# Patient Record
Sex: FEMALE | Race: WHITE | Employment: FULL TIME | URBAN - METROPOLITAN AREA
[De-identification: names, ages, dates, MRNs, and addresses within clinical notes are randomized per-mention and may not be internally consistent; named-entity substitution may affect disease eponyms.]

---

## 2021-05-24 DIAGNOSIS — Z11.59 ENCOUNTER FOR SCREENING FOR OTHER VIRAL DISEASES: ICD-10-CM

## 2021-06-05 ENCOUNTER — HOSPITAL ENCOUNTER (OUTPATIENT)
Dept: LAB | Facility: CLINIC | Age: 56
Discharge: HOME | End: 2021-06-05
Attending: ORTHOPAEDIC SURGERY | Admitting: ORTHOPAEDIC SURGERY
Payer: COMMERCIAL

## 2021-06-05 DIAGNOSIS — Z11.59 ENCOUNTER FOR SCREENING FOR OTHER VIRAL DISEASES: ICD-10-CM

## 2021-06-05 LAB
SARS-COV-2 RNA RESP QL NAA+PROBE: NORMAL
SPECIMEN SOURCE: NORMAL

## 2021-06-05 PROCEDURE — U0003 INFECTIOUS AGENT DETECTION BY NUCLEIC ACID (DNA OR RNA); SEVERE ACUTE RESPIRATORY SYNDROME CORONAVIRUS 2 (SARS-COV-2) (CORONAVIRUS DISEASE [COVID-19]), AMPLIFIED PROBE TECHNIQUE, MAKING USE OF HIGH THROUGHPUT TECHNOLOGIES AS DESCRIBED BY CMS-2020-01-R: HCPCS | Performed by: ORTHOPAEDIC SURGERY

## 2021-06-05 PROCEDURE — U0005 INFEC AGEN DETEC AMPLI PROBE: HCPCS | Performed by: ORTHOPAEDIC SURGERY

## 2021-06-06 LAB
LABORATORY COMMENT REPORT: NORMAL
SARS-COV-2 RNA RESP QL NAA+PROBE: NEGATIVE
SPECIMEN SOURCE: NORMAL

## 2021-06-08 ENCOUNTER — ANESTHESIA EVENT (OUTPATIENT)
Dept: SURGERY | Facility: CLINIC | Age: 56
End: 2021-06-08
Payer: COMMERCIAL

## 2021-06-08 RX ORDER — HYDROCODONE BITARTRATE AND ACETAMINOPHEN 5; 325 MG/1; MG/1
1 TABLET ORAL EVERY 6 HOURS PRN
Status: ON HOLD | COMMUNITY
End: 2021-06-12

## 2021-06-08 RX ORDER — ALBUTEROL SULFATE 90 UG/1
2 AEROSOL, METERED RESPIRATORY (INHALATION) EVERY 4 HOURS PRN
COMMUNITY

## 2021-06-08 RX ORDER — AMLODIPINE BESYLATE 5 MG/1
5 TABLET ORAL EVERY MORNING
COMMUNITY

## 2021-06-08 RX ORDER — CHLORAL HYDRATE 500 MG
1 CAPSULE ORAL DAILY
COMMUNITY

## 2021-06-08 RX ORDER — METHYLPREDNISOLONE 4 MG
TABLET, DOSE PACK ORAL SEE ADMIN INSTRUCTIONS
Status: ON HOLD | COMMUNITY
End: 2021-06-09

## 2021-06-08 RX ORDER — CARISOPRODOL 350 MG/1
350 TABLET ORAL 4 TIMES DAILY
Status: ON HOLD | COMMUNITY
End: 2021-06-12

## 2021-06-08 RX ORDER — BUDESONIDE AND FORMOTEROL FUMARATE DIHYDRATE 160; 4.5 UG/1; UG/1
2 AEROSOL RESPIRATORY (INHALATION) 2 TIMES DAILY
COMMUNITY

## 2021-06-08 RX ORDER — PANTOPRAZOLE SODIUM 40 MG/1
40 TABLET, DELAYED RELEASE ORAL DAILY
COMMUNITY

## 2021-06-08 RX ORDER — DULOXETIN HYDROCHLORIDE 60 MG/1
120 CAPSULE, DELAYED RELEASE ORAL DAILY
COMMUNITY

## 2021-06-08 RX ORDER — ONDANSETRON 4 MG/1
4 TABLET, FILM COATED ORAL EVERY 6 HOURS PRN
COMMUNITY

## 2021-06-08 RX ORDER — ATORVASTATIN CALCIUM 20 MG/1
20 TABLET, FILM COATED ORAL DAILY
COMMUNITY

## 2021-06-08 RX ORDER — CELECOXIB 200 MG/1
200 CAPSULE ORAL DAILY
Status: ON HOLD | COMMUNITY
End: 2021-06-12

## 2021-06-08 RX ORDER — GABAPENTIN 600 MG/1
900 TABLET ORAL 3 TIMES DAILY
COMMUNITY

## 2021-06-08 RX ORDER — LORAZEPAM 1 MG/1
1 TABLET ORAL 3 TIMES DAILY
COMMUNITY

## 2021-06-08 RX ORDER — LEVOTHYROXINE SODIUM 150 UG/1
150 TABLET ORAL DAILY
COMMUNITY

## 2021-06-08 RX ORDER — VITAMIN B COMPLEX
1 TABLET ORAL DAILY
COMMUNITY

## 2021-06-08 RX ORDER — MULTIPLE VITAMINS W/ MINERALS TAB 9MG-400MCG
1 TAB ORAL DAILY
COMMUNITY

## 2021-06-09 ENCOUNTER — HOSPITAL ENCOUNTER (OUTPATIENT)
Facility: CLINIC | Age: 56
Setting detail: OBSERVATION
Discharge: HOME | End: 2021-06-12
Attending: ORTHOPAEDIC SURGERY | Admitting: ORTHOPAEDIC SURGERY
Payer: COMMERCIAL

## 2021-06-09 ENCOUNTER — ANESTHESIA (OUTPATIENT)
Dept: SURGERY | Facility: CLINIC | Age: 56
End: 2021-06-09
Payer: COMMERCIAL

## 2021-06-09 ENCOUNTER — APPOINTMENT (OUTPATIENT)
Dept: GENERAL RADIOLOGY | Facility: CLINIC | Age: 56
End: 2021-06-09
Attending: ORTHOPAEDIC SURGERY
Payer: COMMERCIAL

## 2021-06-09 DIAGNOSIS — M48.02 CERVICAL STENOSIS OF SPINAL CANAL: Primary | ICD-10-CM

## 2021-06-09 LAB
ABO + RH BLD: NORMAL
ABO + RH BLD: NORMAL
BLD GP AB SCN SERPL QL: NORMAL
BLD PROD TYP BPU: NORMAL
BLOOD BANK CMNT PATIENT-IMP: NORMAL
NUM BPU REQUESTED: 2
POTASSIUM SERPL-SCNC: 4.1 MMOL/L (ref 3.4–5.3)
SPECIMEN EXP DATE BLD: NORMAL

## 2021-06-09 PROCEDURE — 250N000011 HC RX IP 250 OP 636: Performed by: ORTHOPAEDIC SURGERY

## 2021-06-09 PROCEDURE — 258N000003 HC RX IP 258 OP 636: Performed by: ANESTHESIOLOGY

## 2021-06-09 PROCEDURE — 999N000141 HC STATISTIC PRE-PROCEDURE NURSING ASSESSMENT: Performed by: ORTHOPAEDIC SURGERY

## 2021-06-09 PROCEDURE — 250N000011 HC RX IP 250 OP 636: Performed by: ANESTHESIOLOGY

## 2021-06-09 PROCEDURE — 360N000085 HC SURGERY LEVEL 5 W/ FLUORO, PER MIN: Performed by: ORTHOPAEDIC SURGERY

## 2021-06-09 PROCEDURE — 272N000282 HC OR IOM SUPPLIES OPNP: Performed by: ORTHOPAEDIC SURGERY

## 2021-06-09 PROCEDURE — 258N000003 HC RX IP 258 OP 636: Performed by: NURSE ANESTHETIST, CERTIFIED REGISTERED

## 2021-06-09 PROCEDURE — 250N000013 HC RX MED GY IP 250 OP 250 PS 637: Performed by: ORTHOPAEDIC SURGERY

## 2021-06-09 PROCEDURE — 250N000009 HC RX 250: Performed by: ORTHOPAEDIC SURGERY

## 2021-06-09 PROCEDURE — 84132 ASSAY OF SERUM POTASSIUM: CPT | Performed by: ANESTHESIOLOGY

## 2021-06-09 PROCEDURE — 36415 COLL VENOUS BLD VENIPUNCTURE: CPT | Performed by: ANESTHESIOLOGY

## 2021-06-09 PROCEDURE — 86900 BLOOD TYPING SEROLOGIC ABO: CPT | Performed by: ANESTHESIOLOGY

## 2021-06-09 PROCEDURE — 250N000025 HC SEVOFLURANE, PER MIN: Performed by: ORTHOPAEDIC SURGERY

## 2021-06-09 PROCEDURE — 86850 RBC ANTIBODY SCREEN: CPT | Performed by: ANESTHESIOLOGY

## 2021-06-09 PROCEDURE — 86923 COMPATIBILITY TEST ELECTRIC: CPT | Performed by: ANESTHESIOLOGY

## 2021-06-09 PROCEDURE — 250N000009 HC RX 250: Performed by: NURSE ANESTHETIST, CERTIFIED REGISTERED

## 2021-06-09 PROCEDURE — 999N000179 XR SURGERY CARM FLUORO LESS THAN 5 MIN W STILLS

## 2021-06-09 PROCEDURE — 250N000011 HC RX IP 250 OP 636: Performed by: NURSE ANESTHETIST, CERTIFIED REGISTERED

## 2021-06-09 PROCEDURE — G0378 HOSPITAL OBSERVATION PER HR: HCPCS

## 2021-06-09 PROCEDURE — 250N000006 HC OR RX SURGIFLO W/THROMBIN KIT 2ML 1991 OPNP: Performed by: ORTHOPAEDIC SURGERY

## 2021-06-09 PROCEDURE — C1713 ANCHOR/SCREW BN/BN,TIS/BN: HCPCS | Performed by: ORTHOPAEDIC SURGERY

## 2021-06-09 PROCEDURE — 272N000001 HC OR GENERAL SUPPLY STERILE: Performed by: ORTHOPAEDIC SURGERY

## 2021-06-09 PROCEDURE — 710N000009 HC RECOVERY PHASE 1, LEVEL 1, PER MIN: Performed by: ORTHOPAEDIC SURGERY

## 2021-06-09 PROCEDURE — 250N000013 HC RX MED GY IP 250 OP 250 PS 637: Performed by: NURSE ANESTHETIST, CERTIFIED REGISTERED

## 2021-06-09 PROCEDURE — 120N000001 HC R&B MED SURG/OB

## 2021-06-09 PROCEDURE — 922N000001 HC NEURO MONITORING SERVICE, UP TO 7 HOURS (T1FEE): Performed by: ORTHOPAEDIC SURGERY

## 2021-06-09 PROCEDURE — C1762 CONN TISS, HUMAN(INC FASCIA): HCPCS | Performed by: ORTHOPAEDIC SURGERY

## 2021-06-09 PROCEDURE — 370N000017 HC ANESTHESIA TECHNICAL FEE, PER MIN: Performed by: ORTHOPAEDIC SURGERY

## 2021-06-09 PROCEDURE — 86901 BLOOD TYPING SEROLOGIC RH(D): CPT | Performed by: ANESTHESIOLOGY

## 2021-06-09 PROCEDURE — 250N000009 HC RX 250: Performed by: ANESTHESIOLOGY

## 2021-06-09 DEVICE — GRAFT BONE FIBERS GRAFTON DBM DBF 3ML T50103: Type: IMPLANTABLE DEVICE | Site: SPINE CERVICAL | Status: FUNCTIONAL

## 2021-06-09 DEVICE — IMPLANTABLE DEVICE: Type: IMPLANTABLE DEVICE | Site: SPINE CERVICAL | Status: FUNCTIONAL

## 2021-06-09 RX ORDER — ONDANSETRON 2 MG/ML
4 INJECTION INTRAMUSCULAR; INTRAVENOUS EVERY 30 MIN PRN
Status: DISCONTINUED | OUTPATIENT
Start: 2021-06-09 | End: 2021-06-09 | Stop reason: HOSPADM

## 2021-06-09 RX ORDER — LEVOTHYROXINE SODIUM 150 UG/1
150 TABLET ORAL
Status: DISCONTINUED | OUTPATIENT
Start: 2021-06-10 | End: 2021-06-12 | Stop reason: HOSPADM

## 2021-06-09 RX ORDER — GLYCOPYRROLATE 0.2 MG/ML
INJECTION, SOLUTION INTRAMUSCULAR; INTRAVENOUS PRN
Status: DISCONTINUED | OUTPATIENT
Start: 2021-06-09 | End: 2021-06-09

## 2021-06-09 RX ORDER — OXYCODONE HYDROCHLORIDE 5 MG/1
5 TABLET ORAL EVERY 4 HOURS PRN
Status: DISCONTINUED | OUTPATIENT
Start: 2021-06-09 | End: 2021-06-12 | Stop reason: HOSPADM

## 2021-06-09 RX ORDER — PROPOFOL 10 MG/ML
INJECTION, EMULSION INTRAVENOUS CONTINUOUS PRN
Status: DISCONTINUED | OUTPATIENT
Start: 2021-06-09 | End: 2021-06-09

## 2021-06-09 RX ORDER — PANTOPRAZOLE SODIUM 40 MG/1
40 TABLET, DELAYED RELEASE ORAL
Status: DISCONTINUED | OUTPATIENT
Start: 2021-06-10 | End: 2021-06-12 | Stop reason: HOSPADM

## 2021-06-09 RX ORDER — BISACODYL 10 MG
10 SUPPOSITORY, RECTAL RECTAL DAILY PRN
Status: DISCONTINUED | OUTPATIENT
Start: 2021-06-09 | End: 2021-06-12 | Stop reason: HOSPADM

## 2021-06-09 RX ORDER — DEXAMETHASONE SODIUM PHOSPHATE 4 MG/ML
INJECTION, SOLUTION INTRA-ARTICULAR; INTRALESIONAL; INTRAMUSCULAR; INTRAVENOUS; SOFT TISSUE PRN
Status: DISCONTINUED | OUTPATIENT
Start: 2021-06-09 | End: 2021-06-09

## 2021-06-09 RX ORDER — SODIUM CHLORIDE, SODIUM LACTATE, POTASSIUM CHLORIDE, CALCIUM CHLORIDE 600; 310; 30; 20 MG/100ML; MG/100ML; MG/100ML; MG/100ML
INJECTION, SOLUTION INTRAVENOUS CONTINUOUS
Status: DISCONTINUED | OUTPATIENT
Start: 2021-06-09 | End: 2021-06-09 | Stop reason: HOSPADM

## 2021-06-09 RX ORDER — NALOXONE HYDROCHLORIDE 0.4 MG/ML
0.4 INJECTION, SOLUTION INTRAMUSCULAR; INTRAVENOUS; SUBCUTANEOUS
Status: DISCONTINUED | OUTPATIENT
Start: 2021-06-09 | End: 2021-06-12 | Stop reason: HOSPADM

## 2021-06-09 RX ORDER — ONDANSETRON 4 MG/1
4 TABLET, FILM COATED ORAL EVERY 6 HOURS PRN
Status: DISCONTINUED | OUTPATIENT
Start: 2021-06-09 | End: 2021-06-09

## 2021-06-09 RX ORDER — ALBUTEROL SULFATE 90 UG/1
2 AEROSOL, METERED RESPIRATORY (INHALATION) EVERY 4 HOURS PRN
Status: DISCONTINUED | OUTPATIENT
Start: 2021-06-09 | End: 2021-06-12 | Stop reason: HOSPADM

## 2021-06-09 RX ORDER — POLYETHYLENE GLYCOL 3350 17 G/17G
17 POWDER, FOR SOLUTION ORAL DAILY
Status: DISCONTINUED | OUTPATIENT
Start: 2021-06-10 | End: 2021-06-12 | Stop reason: HOSPADM

## 2021-06-09 RX ORDER — GABAPENTIN 300 MG/1
300 CAPSULE ORAL
Status: COMPLETED | OUTPATIENT
Start: 2021-06-09 | End: 2021-06-09

## 2021-06-09 RX ORDER — NALOXONE HYDROCHLORIDE 0.4 MG/ML
0.2 INJECTION, SOLUTION INTRAMUSCULAR; INTRAVENOUS; SUBCUTANEOUS
Status: DISCONTINUED | OUTPATIENT
Start: 2021-06-09 | End: 2021-06-12 | Stop reason: HOSPADM

## 2021-06-09 RX ORDER — ONDANSETRON 4 MG/1
4 TABLET, ORALLY DISINTEGRATING ORAL EVERY 30 MIN PRN
Status: DISCONTINUED | OUTPATIENT
Start: 2021-06-09 | End: 2021-06-09 | Stop reason: HOSPADM

## 2021-06-09 RX ORDER — LORAZEPAM 1 MG/1
1 TABLET ORAL 3 TIMES DAILY PRN
Status: DISCONTINUED | OUTPATIENT
Start: 2021-06-09 | End: 2021-06-12 | Stop reason: HOSPADM

## 2021-06-09 RX ORDER — HYDROMORPHONE HCL IN WATER/PF 6 MG/30 ML
0.4 PATIENT CONTROLLED ANALGESIA SYRINGE INTRAVENOUS
Status: DISCONTINUED | OUTPATIENT
Start: 2021-06-09 | End: 2021-06-12 | Stop reason: HOSPADM

## 2021-06-09 RX ORDER — DOCUSATE SODIUM 100 MG/1
100 CAPSULE, LIQUID FILLED ORAL 2 TIMES DAILY
Status: DISCONTINUED | OUTPATIENT
Start: 2021-06-09 | End: 2021-06-12 | Stop reason: HOSPADM

## 2021-06-09 RX ORDER — HYDROMORPHONE HYDROCHLORIDE 1 MG/ML
.3-.5 INJECTION, SOLUTION INTRAMUSCULAR; INTRAVENOUS; SUBCUTANEOUS EVERY 5 MIN PRN
Status: DISCONTINUED | OUTPATIENT
Start: 2021-06-09 | End: 2021-06-09 | Stop reason: HOSPADM

## 2021-06-09 RX ORDER — ALBUTEROL SULFATE 90 UG/1
AEROSOL, METERED RESPIRATORY (INHALATION) PRN
Status: DISCONTINUED | OUTPATIENT
Start: 2021-06-09 | End: 2021-06-09

## 2021-06-09 RX ORDER — CEFAZOLIN SODIUM 2 G/100ML
2 INJECTION, SOLUTION INTRAVENOUS SEE ADMIN INSTRUCTIONS
Status: DISCONTINUED | OUTPATIENT
Start: 2021-06-09 | End: 2021-06-09 | Stop reason: HOSPADM

## 2021-06-09 RX ORDER — ONDANSETRON 2 MG/ML
INJECTION INTRAMUSCULAR; INTRAVENOUS PRN
Status: DISCONTINUED | OUTPATIENT
Start: 2021-06-09 | End: 2021-06-09

## 2021-06-09 RX ORDER — HYDROMORPHONE HCL IN WATER/PF 6 MG/30 ML
0.2 PATIENT CONTROLLED ANALGESIA SYRINGE INTRAVENOUS
Status: DISCONTINUED | OUTPATIENT
Start: 2021-06-09 | End: 2021-06-12 | Stop reason: HOSPADM

## 2021-06-09 RX ORDER — FENTANYL CITRATE 50 UG/ML
INJECTION, SOLUTION INTRAMUSCULAR; INTRAVENOUS PRN
Status: DISCONTINUED | OUTPATIENT
Start: 2021-06-09 | End: 2021-06-09

## 2021-06-09 RX ORDER — FENTANYL CITRATE 50 UG/ML
25-50 INJECTION, SOLUTION INTRAMUSCULAR; INTRAVENOUS
Status: DISCONTINUED | OUTPATIENT
Start: 2021-06-09 | End: 2021-06-09 | Stop reason: HOSPADM

## 2021-06-09 RX ORDER — EPHEDRINE SULFATE 50 MG/ML
INJECTION, SOLUTION INTRAMUSCULAR; INTRAVENOUS; SUBCUTANEOUS PRN
Status: DISCONTINUED | OUTPATIENT
Start: 2021-06-09 | End: 2021-06-09

## 2021-06-09 RX ORDER — SODIUM CHLORIDE, SODIUM LACTATE, POTASSIUM CHLORIDE, CALCIUM CHLORIDE 600; 310; 30; 20 MG/100ML; MG/100ML; MG/100ML; MG/100ML
INJECTION, SOLUTION INTRAVENOUS CONTINUOUS PRN
Status: DISCONTINUED | OUTPATIENT
Start: 2021-06-09 | End: 2021-06-09

## 2021-06-09 RX ORDER — PROPOFOL 10 MG/ML
INJECTION, EMULSION INTRAVENOUS PRN
Status: DISCONTINUED | OUTPATIENT
Start: 2021-06-09 | End: 2021-06-09

## 2021-06-09 RX ORDER — ACETAMINOPHEN 325 MG/1
975 TABLET ORAL EVERY 8 HOURS SCHEDULED
Status: COMPLETED | OUTPATIENT
Start: 2021-06-09 | End: 2021-06-12

## 2021-06-09 RX ORDER — DULOXETIN HYDROCHLORIDE 30 MG/1
120 CAPSULE, DELAYED RELEASE ORAL DAILY
Status: DISCONTINUED | OUTPATIENT
Start: 2021-06-09 | End: 2021-06-12 | Stop reason: HOSPADM

## 2021-06-09 RX ORDER — OXYCODONE HYDROCHLORIDE 5 MG/1
10 TABLET ORAL EVERY 4 HOURS PRN
Status: DISCONTINUED | OUTPATIENT
Start: 2021-06-09 | End: 2021-06-12 | Stop reason: HOSPADM

## 2021-06-09 RX ORDER — ONDANSETRON 4 MG/1
4 TABLET, ORALLY DISINTEGRATING ORAL EVERY 6 HOURS PRN
Status: DISCONTINUED | OUTPATIENT
Start: 2021-06-09 | End: 2021-06-12 | Stop reason: HOSPADM

## 2021-06-09 RX ORDER — LIDOCAINE HYDROCHLORIDE 20 MG/ML
INJECTION, SOLUTION INFILTRATION; PERINEURAL PRN
Status: DISCONTINUED | OUTPATIENT
Start: 2021-06-09 | End: 2021-06-09

## 2021-06-09 RX ORDER — ACETAMINOPHEN 325 MG/1
975 TABLET ORAL ONCE
Status: COMPLETED | OUTPATIENT
Start: 2021-06-09 | End: 2021-06-09

## 2021-06-09 RX ORDER — BACITRACIN ZINC 500 [USP'U]/G
OINTMENT TOPICAL PRN
Status: DISCONTINUED | OUTPATIENT
Start: 2021-06-09 | End: 2021-06-09 | Stop reason: HOSPADM

## 2021-06-09 RX ORDER — ATORVASTATIN CALCIUM 20 MG/1
20 TABLET, FILM COATED ORAL DAILY
Status: DISCONTINUED | OUTPATIENT
Start: 2021-06-09 | End: 2021-06-12 | Stop reason: HOSPADM

## 2021-06-09 RX ORDER — CEFAZOLIN SODIUM 2 G/100ML
2 INJECTION, SOLUTION INTRAVENOUS
Status: DISCONTINUED | OUTPATIENT
Start: 2021-06-09 | End: 2021-06-09 | Stop reason: HOSPADM

## 2021-06-09 RX ORDER — DIPHENHYDRAMINE HYDROCHLORIDE 50 MG/ML
25 INJECTION INTRAMUSCULAR; INTRAVENOUS EVERY 6 HOURS PRN
Status: DISCONTINUED | OUTPATIENT
Start: 2021-06-09 | End: 2021-06-12 | Stop reason: HOSPADM

## 2021-06-09 RX ORDER — VANCOMYCIN HYDROCHLORIDE 1 G/20ML
INJECTION, POWDER, LYOPHILIZED, FOR SOLUTION INTRAVENOUS PRN
Status: DISCONTINUED | OUTPATIENT
Start: 2021-06-09 | End: 2021-06-09 | Stop reason: HOSPADM

## 2021-06-09 RX ORDER — BUDESONIDE AND FORMOTEROL FUMARATE DIHYDRATE 160; 4.5 UG/1; UG/1
2 AEROSOL RESPIRATORY (INHALATION) 2 TIMES DAILY
Status: DISCONTINUED | OUTPATIENT
Start: 2021-06-09 | End: 2021-06-09 | Stop reason: CLARIF

## 2021-06-09 RX ORDER — AMLODIPINE BESYLATE 5 MG/1
5 TABLET ORAL EVERY MORNING
Status: DISCONTINUED | OUTPATIENT
Start: 2021-06-10 | End: 2021-06-12 | Stop reason: HOSPADM

## 2021-06-09 RX ORDER — PROCHLORPERAZINE MALEATE 10 MG
10 TABLET ORAL EVERY 6 HOURS PRN
Status: DISCONTINUED | OUTPATIENT
Start: 2021-06-09 | End: 2021-06-12 | Stop reason: HOSPADM

## 2021-06-09 RX ORDER — CEFAZOLIN SODIUM 1 G/3ML
1 INJECTION, POWDER, FOR SOLUTION INTRAMUSCULAR; INTRAVENOUS EVERY 8 HOURS
Status: COMPLETED | OUTPATIENT
Start: 2021-06-09 | End: 2021-06-10

## 2021-06-09 RX ORDER — DIPHENHYDRAMINE HCL 25 MG
25 CAPSULE ORAL EVERY 6 HOURS PRN
Status: DISCONTINUED | OUTPATIENT
Start: 2021-06-09 | End: 2021-06-12 | Stop reason: HOSPADM

## 2021-06-09 RX ORDER — AMOXICILLIN 250 MG
1 CAPSULE ORAL 2 TIMES DAILY
Status: DISCONTINUED | OUTPATIENT
Start: 2021-06-09 | End: 2021-06-12 | Stop reason: HOSPADM

## 2021-06-09 RX ORDER — LIDOCAINE 40 MG/G
CREAM TOPICAL
Status: DISCONTINUED | OUTPATIENT
Start: 2021-06-09 | End: 2021-06-12 | Stop reason: HOSPADM

## 2021-06-09 RX ORDER — GABAPENTIN 300 MG/1
900 CAPSULE ORAL 3 TIMES DAILY
Status: DISCONTINUED | OUTPATIENT
Start: 2021-06-09 | End: 2021-06-12 | Stop reason: HOSPADM

## 2021-06-09 RX ORDER — ONDANSETRON 2 MG/ML
4 INJECTION INTRAMUSCULAR; INTRAVENOUS EVERY 6 HOURS PRN
Status: DISCONTINUED | OUTPATIENT
Start: 2021-06-09 | End: 2021-06-12 | Stop reason: HOSPADM

## 2021-06-09 RX ORDER — BUPIVACAINE HYDROCHLORIDE AND EPINEPHRINE 2.5; 5 MG/ML; UG/ML
INJECTION, SOLUTION EPIDURAL; INFILTRATION; INTRACAUDAL; PERINEURAL PRN
Status: DISCONTINUED | OUTPATIENT
Start: 2021-06-09 | End: 2021-06-09 | Stop reason: HOSPADM

## 2021-06-09 RX ORDER — ACETAMINOPHEN 325 MG/1
650 TABLET ORAL EVERY 4 HOURS PRN
Status: DISCONTINUED | OUTPATIENT
Start: 2021-06-12 | End: 2021-06-12 | Stop reason: HOSPADM

## 2021-06-09 RX ADMIN — GABAPENTIN 300 MG: 300 CAPSULE ORAL at 06:52

## 2021-06-09 RX ADMIN — ACETAMINOPHEN 975 MG: 325 TABLET, FILM COATED ORAL at 06:52

## 2021-06-09 RX ADMIN — PHENYLEPHRINE HYDROCHLORIDE 100 MCG: 10 INJECTION INTRAVENOUS at 10:43

## 2021-06-09 RX ADMIN — ACETAMINOPHEN 975 MG: 325 TABLET, FILM COATED ORAL at 21:50

## 2021-06-09 RX ADMIN — OXYCODONE HYDROCHLORIDE 5 MG: 5 TABLET ORAL at 20:44

## 2021-06-09 RX ADMIN — PROPOFOL 50 MG: 10 INJECTION, EMULSION INTRAVENOUS at 08:40

## 2021-06-09 RX ADMIN — SODIUM CHLORIDE, POTASSIUM CHLORIDE, SODIUM LACTATE AND CALCIUM CHLORIDE: 600; 310; 30; 20 INJECTION, SOLUTION INTRAVENOUS at 10:47

## 2021-06-09 RX ADMIN — HYDROMORPHONE HYDROCHLORIDE 0.4 MG: 0.2 INJECTION, SOLUTION INTRAMUSCULAR; INTRAVENOUS; SUBCUTANEOUS at 18:26

## 2021-06-09 RX ADMIN — PHENYLEPHRINE HYDROCHLORIDE 100 MCG: 10 INJECTION INTRAVENOUS at 12:37

## 2021-06-09 RX ADMIN — SODIUM CHLORIDE, POTASSIUM CHLORIDE, SODIUM LACTATE AND CALCIUM CHLORIDE: 600; 310; 30; 20 INJECTION, SOLUTION INTRAVENOUS at 07:20

## 2021-06-09 RX ADMIN — Medication 5 MG: at 08:58

## 2021-06-09 RX ADMIN — PHENYLEPHRINE HYDROCHLORIDE 100 MCG: 10 INJECTION INTRAVENOUS at 12:22

## 2021-06-09 RX ADMIN — PROPOFOL 130 MG: 10 INJECTION, EMULSION INTRAVENOUS at 07:59

## 2021-06-09 RX ADMIN — PHENYLEPHRINE HYDROCHLORIDE 150 MCG: 10 INJECTION INTRAVENOUS at 12:20

## 2021-06-09 RX ADMIN — PHENYLEPHRINE HYDROCHLORIDE 0.25 MCG/KG/MIN: 10 INJECTION INTRAVENOUS at 08:14

## 2021-06-09 RX ADMIN — PHENYLEPHRINE HYDROCHLORIDE 100 MCG: 10 INJECTION INTRAVENOUS at 08:39

## 2021-06-09 RX ADMIN — DOCUSATE SODIUM 100 MG: 100 CAPSULE, LIQUID FILLED ORAL at 20:43

## 2021-06-09 RX ADMIN — Medication 5 MG: at 12:19

## 2021-06-09 RX ADMIN — PROPOFOL 20 MG: 10 INJECTION, EMULSION INTRAVENOUS at 08:09

## 2021-06-09 RX ADMIN — PHENYLEPHRINE HYDROCHLORIDE 150 MCG: 10 INJECTION INTRAVENOUS at 09:04

## 2021-06-09 RX ADMIN — HYDROMORPHONE HYDROCHLORIDE 0.5 MG: 1 INJECTION, SOLUTION INTRAMUSCULAR; INTRAVENOUS; SUBCUTANEOUS at 12:11

## 2021-06-09 RX ADMIN — GLYCOPYRROLATE 0.2 MG: 0.2 INJECTION, SOLUTION INTRAMUSCULAR; INTRAVENOUS at 09:50

## 2021-06-09 RX ADMIN — ATORVASTATIN CALCIUM 20 MG: 20 TABLET, FILM COATED ORAL at 16:06

## 2021-06-09 RX ADMIN — DEXMEDETOMIDINE HYDROCHLORIDE 12 MCG: 100 INJECTION, SOLUTION INTRAVENOUS at 08:12

## 2021-06-09 RX ADMIN — PHENYLEPHRINE HYDROCHLORIDE 150 MCG: 10 INJECTION INTRAVENOUS at 08:36

## 2021-06-09 RX ADMIN — CEFAZOLIN SODIUM 2 G: 2 INJECTION, SOLUTION INTRAVENOUS at 09:08

## 2021-06-09 RX ADMIN — HYDROMORPHONE HYDROCHLORIDE 0.4 MG: 0.2 INJECTION, SOLUTION INTRAMUSCULAR; INTRAVENOUS; SUBCUTANEOUS at 16:11

## 2021-06-09 RX ADMIN — LIDOCAINE HYDROCHLORIDE 0.2 ML: 10 INJECTION, SOLUTION EPIDURAL; INFILTRATION; INTRACAUDAL; PERINEURAL at 07:21

## 2021-06-09 RX ADMIN — DEXMEDETOMIDINE HYDROCHLORIDE 0.3 MCG/KG/HR: 100 INJECTION, SOLUTION INTRAVENOUS at 08:44

## 2021-06-09 RX ADMIN — SODIUM CHLORIDE, POTASSIUM CHLORIDE, SODIUM LACTATE AND CALCIUM CHLORIDE: 600; 310; 30; 20 INJECTION, SOLUTION INTRAVENOUS at 08:33

## 2021-06-09 RX ADMIN — DEXMEDETOMIDINE HYDROCHLORIDE 20 MCG: 100 INJECTION, SOLUTION INTRAVENOUS at 08:20

## 2021-06-09 RX ADMIN — LIDOCAINE HYDROCHLORIDE 100 MG: 20 INJECTION, SOLUTION INFILTRATION; PERINEURAL at 07:59

## 2021-06-09 RX ADMIN — CEFAZOLIN 1 G: 1 INJECTION, POWDER, FOR SOLUTION INTRAMUSCULAR; INTRAVENOUS at 17:23

## 2021-06-09 RX ADMIN — FENTANYL CITRATE 25 MCG: 50 INJECTION, SOLUTION INTRAMUSCULAR; INTRAVENOUS at 12:58

## 2021-06-09 RX ADMIN — PROPOFOL 20 MG: 10 INJECTION, EMULSION INTRAVENOUS at 08:49

## 2021-06-09 RX ADMIN — ACETAMINOPHEN 975 MG: 325 TABLET, FILM COATED ORAL at 16:06

## 2021-06-09 RX ADMIN — FENTANYL CITRATE 25 MCG: 50 INJECTION, SOLUTION INTRAMUSCULAR; INTRAVENOUS at 12:56

## 2021-06-09 RX ADMIN — PROPOFOL 50 MG: 10 INJECTION, EMULSION INTRAVENOUS at 08:13

## 2021-06-09 RX ADMIN — PHENYLEPHRINE HYDROCHLORIDE 100 MCG: 10 INJECTION INTRAVENOUS at 08:45

## 2021-06-09 RX ADMIN — PHENYLEPHRINE HYDROCHLORIDE 100 MCG: 10 INJECTION INTRAVENOUS at 09:37

## 2021-06-09 RX ADMIN — FENTANYL CITRATE 100 MCG: 50 INJECTION, SOLUTION INTRAMUSCULAR; INTRAVENOUS at 07:59

## 2021-06-09 RX ADMIN — FENTANYL CITRATE 50 MCG: 50 INJECTION INTRAMUSCULAR; INTRAVENOUS at 13:17

## 2021-06-09 RX ADMIN — REMIFENTANIL HYDROCHLORIDE 0.05 MCG/KG/MIN: 1 INJECTION, POWDER, LYOPHILIZED, FOR SOLUTION INTRAVENOUS at 08:41

## 2021-06-09 RX ADMIN — SENNOSIDES AND DOCUSATE SODIUM 1 TABLET: 8.6; 5 TABLET ORAL at 20:45

## 2021-06-09 RX ADMIN — SUCCINYLCHOLINE CHLORIDE 100 MG: 20 INJECTION, SOLUTION INTRAMUSCULAR; INTRAVENOUS; PARENTERAL at 07:59

## 2021-06-09 RX ADMIN — ALBUTEROL SULFATE 2 PUFF: 90 AEROSOL, METERED RESPIRATORY (INHALATION) at 08:57

## 2021-06-09 RX ADMIN — DEXAMETHASONE SODIUM PHOSPHATE 4 MG: 4 INJECTION, SOLUTION INTRA-ARTICULAR; INTRALESIONAL; INTRAMUSCULAR; INTRAVENOUS; SOFT TISSUE at 09:14

## 2021-06-09 RX ADMIN — HYDROMORPHONE HYDROCHLORIDE 0.5 MG: 1 INJECTION, SOLUTION INTRAMUSCULAR; INTRAVENOUS; SUBCUTANEOUS at 14:35

## 2021-06-09 RX ADMIN — DEXMEDETOMIDINE HYDROCHLORIDE 8 MCG: 100 INJECTION, SOLUTION INTRAVENOUS at 08:15

## 2021-06-09 RX ADMIN — PHENYLEPHRINE HYDROCHLORIDE 100 MCG: 10 INJECTION INTRAVENOUS at 10:49

## 2021-06-09 RX ADMIN — DULOXETINE HYDROCHLORIDE 120 MG: 30 CAPSULE, DELAYED RELEASE ORAL at 16:05

## 2021-06-09 RX ADMIN — ONDANSETRON 4 MG: 2 INJECTION INTRAMUSCULAR; INTRAVENOUS at 12:11

## 2021-06-09 RX ADMIN — GABAPENTIN 900 MG: 300 CAPSULE ORAL at 16:05

## 2021-06-09 RX ADMIN — PHENYLEPHRINE HYDROCHLORIDE 200 MCG: 10 INJECTION INTRAVENOUS at 08:34

## 2021-06-09 RX ADMIN — PROPOFOL 50 MG: 10 INJECTION, EMULSION INTRAVENOUS at 08:07

## 2021-06-09 RX ADMIN — MIDAZOLAM 2 MG: 1 INJECTION INTRAMUSCULAR; INTRAVENOUS at 07:59

## 2021-06-09 RX ADMIN — PROPOFOL 150 MCG/KG/MIN: 10 INJECTION, EMULSION INTRAVENOUS at 08:44

## 2021-06-09 RX ADMIN — GABAPENTIN 900 MG: 300 CAPSULE ORAL at 21:50

## 2021-06-09 ASSESSMENT — ACTIVITIES OF DAILY LIVING (ADL)
ADLS_ACUITY_SCORE: 18
ADLS_ACUITY_SCORE: 18

## 2021-06-09 ASSESSMENT — ENCOUNTER SYMPTOMS
ORTHOPNEA: 0
SEIZURES: 0

## 2021-06-09 ASSESSMENT — MIFFLIN-ST. JEOR: SCORE: 1274.21

## 2021-06-09 ASSESSMENT — LIFESTYLE VARIABLES: TOBACCO_USE: 1

## 2021-06-09 ASSESSMENT — COPD QUESTIONNAIRES: COPD: 1

## 2021-06-09 NOTE — ANESTHESIA PROCEDURE NOTES
Airway       Patient location during procedure: OR  Staff -        CRNA: Juliette Godfrey APRN CRNA       Performed By: CRNA  Consent for Airway        Urgency: elective  Indications and Patient Condition       Indications for airway management: mino-procedural       Induction type:intravenous       Mask difficulty assessment: 2 - vent by mask + OA or adjuvant +/- NMBA    Final Airway Details       Final airway type: endotracheal airway       Successful airway: ETT - single  Endotracheal Airway Details        ETT size (mm): 7.0       Successful intubation technique: video laryngoscopy       VL Blade Size: Glidescope 3       Grade View of Cords: 1       Adjucts: stylet       Position: Left       Measured from: lips       Secured at (cm): 22       Bite block used: Soft    Post intubation assessment        Placement verified by: capnometry, equal breath sounds and chest rise        Number of attempts at approach: 1       Secured with: pink tape       Ease of procedure: easy       Dentition: Unchanged    Medication(s) Administered   Medication Administration Time: 6/9/2021 8:02 AM

## 2021-06-09 NOTE — ANESTHESIA POSTPROCEDURE EVALUATION
Patient: Veronica Lala    Procedure(s):  CERVICAL 2 TO CERVICAL 4 LAMINECTOMY AND POSTERIOR INSTRUMENTED FUSION    Diagnosis:Spondylolisthesis of cervical region [M43.12]  Other kyphosis, cervical region [M40.292]  Spinal stenosis in cervical region [M48.02]  Diagnosis Additional Information: No value filed.    Anesthesia Type:  General    Note:  Disposition: Admission   Postop Pain Control: Uneventful            Sign Out: Well controlled pain   PONV: No   Neuro/Psych: Uneventful            Sign Out: Acceptable/Baseline neuro status   Airway/Respiratory: Uneventful            Sign Out: Acceptable/Baseline resp. status   CV/Hemodynamics: Uneventful            Sign Out: Acceptable CV status; No obvious hypovolemia; No obvious fluid overload   Other NRE: NONE   DID A NON-ROUTINE EVENT OCCUR? No           Last vitals:  Vitals:    06/09/21 1500 06/09/21 1515 06/09/21 1529   BP: 130/82 131/79 126/82   Pulse: 68 65 66   Resp: 18 20 18   Temp: 36.5  C (97.7  F)  36.3  C (97.4  F)   SpO2: 94% 93% 96%       Last vitals prior to Anesthesia Care Transfer:  CRNA VITALS  6/9/2021 1235 - 6/9/2021 1335      6/9/2021             Pulse:  96    Ht Rate:  95    SpO2:  99 %    Resp Rate (set):  10          Electronically Signed By: Nolan Farley MD  June 9, 2021  3:38 PM

## 2021-06-09 NOTE — OP NOTE
Orthopedic Surgery Operative Report    Patient:   Veronica Lala  MRN:   [id number]   :  1965  Facility: [facility]   Date:  21         PREOPERATIVE DIAGNOSIS:    Cervical myelopathy  Severe stenosis C2-3, C3-4    POSTOPERATIVE DIAGNOSIS:    Cervical myelopathy  Severe stenosis C2-3, C3-4    PROCEDURE PERFORMED:    1. Laminectomy C2-4  2. Posterior instrumented spinal fusion C2-4  3. Use of nonstructural allograft, local bone  4. Application and removal of Stephenson head moore  5. Use of Stealth    SURGEON:    Mal Sampson MD    SURGICAL ASSISTANT:    RASHMI Perez     ANESTHESIA:  General    FINDINGS:    Severe central stenosis at C2-3, C3-4.    COMPLICATIONS:     None apparent    SPECIMENS:    None    ESTIMATED BLOOD LOSS:  50 cc    IMPLANTS:    Medtronic Infinity 3.5 millimeter screws x6  Two 30 mm titanium rods  3 cc Medtronic DBF allograft    INDICATION FOR OPERATION:  The patient is a 55-year-old female who has been exhibiting symptoms of cervical myelopathy.  Advanced imaging demonstrated severe stenosis at the C3-4 level, and also the C2-3 level.  I discussed with her the risks benefits and alternatives of the above operation as a means to decompress the spinal cord.  After this discussion she did wish to proceed.    DESCRIPTION OF PROCEDURE:    The patient was met in the preoperative holding area and the operative site was confirmed and marked.  We once again reviewed the risks, benefits, and alternatives of the operation and the patient wished to proceed with the surgery.    The patient was met in the preoperative holding area and the operative site was confirmed and marked.  We once again reviewed the risks, benefits, and alternatives of the operation and the patient wished to proceed with the surgery.     The patient was transported to the operating room and induced under general anesthesia.  I applied a Stephenson head tongs with 60 pounds of tension  across it, taking care that all pin sites were in appropriate position.  Neuro monitoring was established and a baseline was obtained.  Thereafter the patient was positioned prone with all bony prominences well padded.  Repeat neuro monitoring checks demonstrated no changes from baseline. The surgical site was prepped and draped in the usual standard fashion.     A midline incision was made over the posterior cervical spine and dissection carried down to the spinous processes.  The midline structures including the interspinous ligaments were left intact, and the muscle was elevated off of the posterior vertebral elements.    I was able to confirm the C2 spinous process by its bifid nature, as the C3 and C4 spinous processes were not bifid in this patient.  I therefore dissected out the lateral masses from C2-C4.  I took care not to elevate the muscle from the superior aspect of the C2 spinous process to preserve the posterior tension band at that site.    I next brought in the O-arm.  I placed my reference frame on the C2 spinous process.   Once we did our navigation spin, I then under navigation drilled and tapped bilateral lateral mass screw  holes at C3 and C4, and into the pars bilaterally at C2.  I then burred out the facet  joints at C2-3 and C3-4 bilaterally under navigation guidance, and placed DBF allograft within these facet joints to promote fusion.    I next performed dome laminotomies at C2 and C4, and thereby released the ligamentum flavum from the undersurface.  I next used the sonopet bone scalpel to create cuts in the bilateral C3 lamina.  And then gently elevated the lamina away from the thecal sac and released the remaining ligamentum flavum to take off the lamina en bloc.    At this point the spinal cord appeared well decompressed with no adhesions causing any ongoing compression.  The spinal cord was protected at all times through remainder of the case.    I next placed my screws into the  C2-4 vertebra bilaterally through my previously established  holes.  I decorticated the bone medial and lateral to the screw heads.  I then scrubbed out and brought the patient's neck into extension with use of the Stephenson head moore and C-Flex.  I brought in fluoroscopy and confirmed appropriate position of the patient's cervical lordosis.  I then measured for and placed rods into the screw heads bilaterally and placed setscrews.  There was a small coronal malalignment at this time, therefore I gently compressed and distracted to level the alignment C2-4.  Following this x-ray demonstrated no coronal malalignment C2-4.  I then final tightened my set screws in place.    The operative site was thoroughly irrigated.  1 g of vancomycin powder was placed in the wound.  A deep drain was placed.  Closure was performed with gentle reapproximation of the deep muscle layer with 0 Vicryl, fascia was closed with a #1 strata fix, subcutaneous tissues closed with 2-0 Vicryl, and skin closed with a running Monocryl.  Skin glue and Steri-Strips were applied.  All neuro monitoring signals were stable throughout the case without any notable events including at final motor check.    The patient was transitioned to the supine position and the Stephenson head moore was removed.  The patient was transported to PACU in stable condition    A surgical assistant was critical for this case to assist in retraction of the soft tissues and evacuating blood from the surgical field to facilitate a safer operation with improved visualization and less time under anesthesia.     All sponge and needle counts were correct at case conclusion.      POSTOPERATIVE PLAN:  -Activity:    Up with assist  -Weight Bearing Status:  WBAT   -Bracing:   Aspen collar at all times  -Antibiotics:     Ancef x24h  -Anticoagulation:   SCDs only  -Pain control:    IV and PO, wean to PO as able  -Drain:     Monitor and chart output  -Dressing:    Once drain removed,  ok to shower with dressing in place, dressing ok to get wet.  -Diet:     ADAT  -Labs:    AM Hgb, BMP  -Imaging:   XR C spine prior to discharge    -Disposition:    Pending medical stability, PT, anticipate discharge around POD 3  -Follow up:     2 weeks in my clinic        Mal Sampson MD

## 2021-06-09 NOTE — ANESTHESIA PREPROCEDURE EVALUATION
Anesthesia Pre-Procedure Evaluation    Patient: Veronica Lala   MRN: [id number] : 1965        Preoperative Diagnosis: Spondylolisthesis of cervical region [M43.12]  Other kyphosis, cervical region [M40.292]  Spinal stenosis in cervical region [M48.02]   Procedure : Procedure(s):  CERVICAL 2 TO CERVICAL 4 LAMINECTOMY AND POSTERIOR INSTRUMENTED FUSION  POSSIBLE LEFT ILIAC CREST AUTOGRAFT HARVEST     Past Medical History:   Diagnosis Date     Abnormal albumin      Acute exacerbation of chronic low back pain      Acute pain of left knee      Arthritis     OA neck     Bunion, right foot      COPD (chronic obstructive pulmonary disease) (H)      Depression with anxiety      Fibromyalgia      HLD (hyperlipidemia)      HTN (hypertension)      Lumbar disc disease      Nausea      Peripheral neuropathy      Recurrent falls      Thyroid disease      Ulcer of gastric fundus      Urinary incontinence       Past Surgical History:   Procedure Laterality Date     BACK SURGERY      lumbar lami     ENT SURGERY      T and A     GYN SURGERY      tubal     ORTHOPEDIC SURGERY Right     knee arthroscopy      Allergies   Allergen Reactions     Morphine Itching     Nausea and cold sweats      Social History     Tobacco Use     Smoking status: Not on file   Substance Use Topics     Alcohol use: Not on file      Wt Readings from Last 1 Encounters:   No data found for Wt      Stress echo -   Indication for study: CP.  Cardiac Rhythm: Regular.Study quality:    Final Impressions:   1. Negative stress echo for ischemia.   2. Post stress, decreased left ventricular size, increased global systolic function with an estimated EF of 65 to 70%.   3. Maximum stress test with 102.9% of age predicted maximum heart rate achieved.   4. There were no ischemic changes by EKG during stress.   5. Good exercise duration and workload.   6. During stress exam the patient developed fatigue, shortness of breath and Mild Chest Tightness.    HGB 14  K  4.4    Anesthesia Evaluation   Pt has had prior anesthetic.     No history of anesthetic complications       ROS/MED HX  ENT/Pulmonary:     (+) tobacco use (quit several months ago), Past use, COPD (daily inhalers),  (-) sleep apnea and recent URI   Neurologic: Comment: Recurrent falls    Paresthesias bilateral upper extremities - exacerbated with neck rotation/ neck extension    Bilateral lower extremity neuropathy  Post lumbar spine surgery    (+) peripheral neuropathy, - peripheral neuropathy.  (-) no seizures, no CVA and migraines   Cardiovascular:     (+) hypertension----- (-) CHF and orthopnea/PND   METS/Exercise Tolerance:     Hematologic:       Musculoskeletal: Comment: Cervical spinal stenosis    Spondylolisthesis cervical region with myelopathy/ radiculopathy  Chronic low back pain  Fibromyalgia  Lumbar disc disease        GI/Hepatic: Comment: Ulcer   (-) GERD   Renal/Genitourinary: Comment: Urinary incontinence - neg Renal ROS     Endo:     (+) thyroid problem, hypothyroidism,  (-) Type II DM   Psychiatric/Substance Use:  - neg psychiatric ROS     Infectious Disease:       Malignancy:       Other:            Physical Exam    Airway        Mallampati: II   TM distance: > 3 FB   Neck ROM: limited   Mouth opening: > 3 cm    Respiratory Devices and Support         Dental  no notable dental history         Cardiovascular   cardiovascular exam normal       Rhythm and rate: regular and normal     Pulmonary       Comment: End expiratory wheeze cleared after coughing    breath sounds clear to auscultation           OUTSIDE LABS:  CBC: No results found for: WBC, HGB, HCT, PLT  BMP: No results found for: NA, POTASSIUM, CHLORIDE, CO2, BUN, CR, GLC  COAGS: No results found for: PTT, INR, FIBR  POC: No results found for: BGM, HCG, HCGS  HEPATIC: No results found for: ALBUMIN, PROTTOTAL, ALT, AST, GGT, ALKPHOS, BILITOTAL, BILIDIRECT, ROMEO  OTHER: No results found for: PH, LACT, A1C, GARRICK, PHOS, MAG, LIPASE, AMYLASE,  TSH, T4, T3, CRP, SED    Anesthesia Plan    ASA Status:  2   NPO Status:  NPO Appropriate    Anesthesia Type: General.     - Airway: ETT   Induction: Propofol.   Maintenance: Balanced.   Techniques and Equipment:     - Airway: Video-Laryngoscope     - Lines/Monitors: 2nd IV     Consents    Anesthesia Plan(s) and associated risks, benefits, and realistic alternatives discussed. Questions answered and patient/representative(s) expressed understanding.     - Discussed with:  Patient         Postoperative Care    Pain management: Multi-modal analgesia.   PONV prophylaxis: Ondansetron (or other 5HT-3), Dexamethasone or Solumedrol, Background Propofol Infusion     Comments:    precedex infusion            Nolan Farley MD

## 2021-06-09 NOTE — PROGRESS NOTES
PTA medications completed by Medication Scribe day of surgery     Medication history sources: Patient, Surescripts and H&P  In the past week, patient estimated taking medication this percent of the time: Greater than 90%  Adherence assessment: N/A Not Observed    Significant changes made to the medication list:  Patient reports no longer taking the following meds (med scribe removed from PTA med list): Iron tabley, Medrol dosepak      Additional medication history information:   None    Medication reconciliation completed by provider prior to medication history? No    Time spent in this activity: 20 minutes      The information provided in this note is only as accurate as the sources available at the time of update(s)      Prior to Admission medications    Medication Sig Last Dose Taking? Auth Provider   albuterol (PROAIR HFA/PROVENTIL HFA/VENTOLIN HFA) 108 (90 Base) MCG/ACT inhaler Inhale 2 puffs into the lungs every 4 hours as needed for shortness of breath / dyspnea 6/9/2021 at 0400 Yes Reported, Patient   amLODIPine (NORVASC) 5 MG tablet Take 5 mg by mouth every morning 6/8/2021 at am Yes Reported, Patient   atorvastatin (LIPITOR) 20 MG tablet Take 20 mg by mouth daily 6/8/2021 at am Yes Reported, Patient   budesonide-formoterol (SYMBICORT) 160-4.5 MCG/ACT Inhaler Inhale 2 puffs into the lungs 2 times daily 6/9/2021 at 0400 Yes Reported, Patient   carisoprodol (SOMA) 350 MG tablet Take 350 mg by mouth 4 times daily 6/8/2021 at 2100 Yes Reported, Patient   celecoxib (CELEBREX) 200 MG capsule Take 200 mg by mouth daily 6/8/2021 at am Yes Reported, Patient   DULoxetine (CYMBALTA) 60 MG capsule Take 120 mg by mouth daily (60mg x 2 = 120mg) 6/8/2021 at am Yes Reported, Patient   fish oil-omega-3 fatty acids 1000 MG capsule Take 1 g by mouth daily 6/8/2021 at am Yes Reported, Patient   gabapentin (NEURONTIN) 600 MG tablet Take 900 mg by mouth 3 times daily (600mg x 1.5 = 900) 6/9/2021 at 0400 Yes Reported, Patient    Homeopathic Products (LEG CRAMP RELIEF) TABS Take 2-4 tablets by mouth daily 6/8/2021 Yes Reported, Patient   HYDROcodone-acetaminophen (NORCO) 5-325 MG tablet Take 1 tablet by mouth every 6 hours as needed for severe pain 6/9/2021 at 0400 Yes Reported, Patient   levothyroxine (SYNTHROID/LEVOTHROID) 150 MCG tablet Take 150 mcg by mouth daily 6/8/2021 at am Yes Reported, Patient   LORazepam (ATIVAN) 1 MG tablet Take 1 mg by mouth 3 times daily 6/9/2021 at 0400 Yes Reported, Patient   multivitamin w/minerals (MULTI-VITAMIN) tablet Take 1 tablet by mouth daily 6/8/2021 at am Yes Reported, Patient   ondansetron (ZOFRAN) 4 MG tablet Take 4 mg by mouth every 6 hours as needed for nausea 6/9/2021 at 0400 Yes Reported, Patient   pantoprazole (PROTONIX) 40 MG EC tablet Take 40 mg by mouth daily 6/8/2021 at am Yes Reported, Patient   Potassium (POTASSIMIN PO) Take 1 tablet by mouth daily (over the counter) 6/8/2021 at am Yes Reported, Patient   Vitamin D3 (CHOLECALCIFEROL) 25 mcg (1000 units) tablet Take 1 tablet by mouth daily 6/8/2021 at am Yes Reported, Patient

## 2021-06-09 NOTE — ANESTHESIA CARE TRANSFER NOTE
Patient: Veronica Lala    Procedure(s):  CERVICAL 2 TO CERVICAL 4 LAMINECTOMY AND POSTERIOR INSTRUMENTED FUSION    Diagnosis: Spondylolisthesis of cervical region [M43.12]  Other kyphosis, cervical region [M40.292]  Spinal stenosis in cervical region [M48.02]  Diagnosis Additional Information: No value filed.    Anesthesia Type:   General     Note:    Oropharynx: oropharynx clear of all foreign objects and spontaneously breathing  Level of Consciousness: awake and drowsy  Oxygen Supplementation: face mask  Level of Supplemental Oxygen (L/min / FiO2): 6  Independent Airway: airway patency satisfactory and stable  Dentition: dentition unchanged  Vital Signs Stable: post-procedure vital signs reviewed and stable  Report to RN Given: handoff report given  Patient transferred to: PACU  Comments: Neuromuscular blockade not used after succinylcholine for intubation, spontaneous return of TOF 4/4 with sustained tetany, spontaneous respirations, adequate tidal volumes, followed commands to voice, oropharynx suctioned with soft flexible catheter, extubated atraumatically, extubated with suction, airway patent after extubation.  Oxygen via facemask at 6 liters per minute to PACU. After extubation, patient remained in OR for 14 minutes until transport to PACU due to standard hospital COVID-19 precautions, Oxygen tubing connected to wall O2 in PACU, SpO2, NiBP, and EKG monitors and alarms on and functioning, Ingris Hugger warmer connected to patient gown, report on patient's clinical status given to PACU RN, RN questions answered.     Handoff Report: Identifed the Patient, Identified the Reponsible Provider, Reviewed the pertinent medical history, Discussed the surgical course, Reviewed Intra-OP anesthesia mangement and issues during anesthesia, Set expectations for post-procedure period and Allowed opportunity for questions and acknowledgement of understanding      Vitals: (Last set prior to Anesthesia Care Transfer)  CRNA  VITALS  6/9/2021 1235 - 6/9/2021 1310      6/9/2021             Pulse:  96    Ht Rate:  95    SpO2:  99 %    Resp Rate (set):  10        Electronically Signed By: LAURA Zuniga CRNA  June 9, 2021  1:10 PM

## 2021-06-09 NOTE — OR NURSING
Pt reports taking her gabapentin 600 this am, however usually takes 900, Dr Farley aware and ok to give her 300 of gabapentin ordered this am pre surgery.

## 2021-06-10 ENCOUNTER — APPOINTMENT (OUTPATIENT)
Dept: PHYSICAL THERAPY | Facility: CLINIC | Age: 56
End: 2021-06-10
Attending: ORTHOPAEDIC SURGERY
Payer: COMMERCIAL

## 2021-06-10 LAB
ANION GAP SERPL CALCULATED.3IONS-SCNC: 5 MMOL/L (ref 3–14)
BLD PROD TYP BPU: NORMAL
BLD PROD TYP BPU: NORMAL
BLD UNIT ID BPU: 0
BLD UNIT ID BPU: 0
BLOOD PRODUCT CODE: NORMAL
BLOOD PRODUCT CODE: NORMAL
BPU ID: NORMAL
BPU ID: NORMAL
BUN SERPL-MCNC: 9 MG/DL (ref 7–30)
CALCIUM SERPL-MCNC: 9.1 MG/DL (ref 8.5–10.1)
CHLORIDE SERPL-SCNC: 101 MMOL/L (ref 94–109)
CO2 SERPL-SCNC: 28 MMOL/L (ref 20–32)
CREAT SERPL-MCNC: 0.69 MG/DL (ref 0.52–1.04)
GFR SERPL CREATININE-BSD FRML MDRD: >90 ML/MIN/{1.73_M2}
GLUCOSE BLDC GLUCOMTR-MCNC: 112 MG/DL (ref 70–99)
GLUCOSE BLDC GLUCOMTR-MCNC: 117 MG/DL (ref 70–99)
GLUCOSE SERPL-MCNC: 126 MG/DL (ref 70–99)
HGB BLD-MCNC: 13.3 G/DL (ref 11.7–15.7)
POTASSIUM SERPL-SCNC: 3.8 MMOL/L (ref 3.4–5.3)
SODIUM SERPL-SCNC: 134 MMOL/L (ref 133–144)
TRANSFUSION STATUS PATIENT QL: NORMAL

## 2021-06-10 PROCEDURE — 250N000013 HC RX MED GY IP 250 OP 250 PS 637

## 2021-06-10 PROCEDURE — 120N000001 HC R&B MED SURG/OB

## 2021-06-10 PROCEDURE — 250N000011 HC RX IP 250 OP 636: Performed by: ORTHOPAEDIC SURGERY

## 2021-06-10 PROCEDURE — G0378 HOSPITAL OBSERVATION PER HR: HCPCS

## 2021-06-10 PROCEDURE — 97116 GAIT TRAINING THERAPY: CPT | Mod: GP

## 2021-06-10 PROCEDURE — 250N000013 HC RX MED GY IP 250 OP 250 PS 637: Performed by: ORTHOPAEDIC SURGERY

## 2021-06-10 PROCEDURE — 36415 COLL VENOUS BLD VENIPUNCTURE: CPT | Performed by: ORTHOPAEDIC SURGERY

## 2021-06-10 PROCEDURE — 97161 PT EVAL LOW COMPLEX 20 MIN: CPT | Mod: GP

## 2021-06-10 PROCEDURE — 80048 BASIC METABOLIC PNL TOTAL CA: CPT | Performed by: ORTHOPAEDIC SURGERY

## 2021-06-10 PROCEDURE — 97530 THERAPEUTIC ACTIVITIES: CPT | Mod: GP

## 2021-06-10 PROCEDURE — 999N001017 HC STATISTIC GLUCOSE BY METER IP

## 2021-06-10 PROCEDURE — 85018 HEMOGLOBIN: CPT | Performed by: ORTHOPAEDIC SURGERY

## 2021-06-10 RX ORDER — IPRATROPIUM BROMIDE AND ALBUTEROL SULFATE 2.5; .5 MG/3ML; MG/3ML
3 SOLUTION RESPIRATORY (INHALATION) EVERY 4 HOURS PRN
Status: DISCONTINUED | OUTPATIENT
Start: 2021-06-10 | End: 2021-06-12 | Stop reason: HOSPADM

## 2021-06-10 RX ORDER — METHOCARBAMOL 500 MG/1
500 TABLET, FILM COATED ORAL 4 TIMES DAILY PRN
Status: DISCONTINUED | OUTPATIENT
Start: 2021-06-10 | End: 2021-06-12 | Stop reason: HOSPADM

## 2021-06-10 RX ADMIN — LEVOTHYROXINE SODIUM 150 MCG: 150 TABLET ORAL at 06:59

## 2021-06-10 RX ADMIN — FLUTICASONE FUROATE AND VILANTEROL TRIFENATATE 1 PUFF: 200; 25 POWDER RESPIRATORY (INHALATION) at 07:59

## 2021-06-10 RX ADMIN — AMLODIPINE BESYLATE 5 MG: 5 TABLET ORAL at 07:55

## 2021-06-10 RX ADMIN — PANTOPRAZOLE SODIUM 40 MG: 40 TABLET, DELAYED RELEASE ORAL at 06:59

## 2021-06-10 RX ADMIN — POLYETHYLENE GLYCOL 3350 17 G: 17 POWDER, FOR SOLUTION ORAL at 07:51

## 2021-06-10 RX ADMIN — ALBUTEROL SULFATE 2 PUFF: 90 INHALANT RESPIRATORY (INHALATION) at 08:22

## 2021-06-10 RX ADMIN — GABAPENTIN 900 MG: 300 CAPSULE ORAL at 16:08

## 2021-06-10 RX ADMIN — GABAPENTIN 900 MG: 300 CAPSULE ORAL at 21:26

## 2021-06-10 RX ADMIN — METHOCARBAMOL 500 MG: 500 TABLET ORAL at 07:56

## 2021-06-10 RX ADMIN — LORAZEPAM 1 MG: 1 TABLET ORAL at 02:19

## 2021-06-10 RX ADMIN — OXYCODONE HYDROCHLORIDE 10 MG: 5 TABLET ORAL at 13:44

## 2021-06-10 RX ADMIN — ACETAMINOPHEN 975 MG: 325 TABLET, FILM COATED ORAL at 21:26

## 2021-06-10 RX ADMIN — DULOXETINE HYDROCHLORIDE 120 MG: 30 CAPSULE, DELAYED RELEASE ORAL at 07:56

## 2021-06-10 RX ADMIN — OXYCODONE HYDROCHLORIDE 10 MG: 5 TABLET ORAL at 17:56

## 2021-06-10 RX ADMIN — GABAPENTIN 900 MG: 300 CAPSULE ORAL at 07:55

## 2021-06-10 RX ADMIN — ATORVASTATIN CALCIUM 20 MG: 20 TABLET, FILM COATED ORAL at 07:57

## 2021-06-10 RX ADMIN — LORAZEPAM 1 MG: 1 TABLET ORAL at 10:30

## 2021-06-10 RX ADMIN — OXYCODONE HYDROCHLORIDE 10 MG: 5 TABLET ORAL at 00:58

## 2021-06-10 RX ADMIN — ACETAMINOPHEN 975 MG: 325 TABLET, FILM COATED ORAL at 05:07

## 2021-06-10 RX ADMIN — ACETAMINOPHEN 975 MG: 325 TABLET, FILM COATED ORAL at 13:43

## 2021-06-10 RX ADMIN — CEFAZOLIN 1 G: 1 INJECTION, POWDER, FOR SOLUTION INTRAMUSCULAR; INTRAVENOUS at 01:23

## 2021-06-10 RX ADMIN — METHOCARBAMOL 500 MG: 500 TABLET ORAL at 13:44

## 2021-06-10 RX ADMIN — LORAZEPAM 1 MG: 1 TABLET ORAL at 20:07

## 2021-06-10 RX ADMIN — OXYCODONE HYDROCHLORIDE 10 MG: 5 TABLET ORAL at 09:36

## 2021-06-10 RX ADMIN — OXYCODONE HYDROCHLORIDE 10 MG: 5 TABLET ORAL at 05:07

## 2021-06-10 ASSESSMENT — ACTIVITIES OF DAILY LIVING (ADL)
ADLS_ACUITY_SCORE: 18

## 2021-06-10 ASSESSMENT — MIFFLIN-ST. JEOR: SCORE: 1284.42

## 2021-06-10 NOTE — PLAN OF CARE
POD 0 C2-4 lami/fusion. A&O. VSS. CMS intact. Dressing CDI pain reduced with PRN oxycodone scheduled tylenol. Aspen collar in place. HVAC drain. Cabrera patent.

## 2021-06-10 NOTE — PROGRESS NOTES
06/10/21 0851   Quick Adds   Type of Visit Initial PT Evaluation   Living Environment   People in home spouse   Current Living Arrangements house   Living Environment Comments 4 SCARLET then needs on main level    Self-Care   Usual Activity Tolerance good   Current Activity Tolerance moderate   Activity/Exercise/Self-Care Comment IND at baseline. Baseline numbness/tingling in BUE/BLE   Disability/Function   Fall history within last six months no   Change in Functional Status Since Onset of Current Illness/Injury yes   General Information   Onset of Illness/Injury or Date of Surgery 06/09/21   Referring Physician Mal Sampson MD    Patient/Family Therapy Goals Statement (PT) to get stronger and go home   Pertinent History of Current Problem (include personal factors and/or comorbidities that impact the POC)  S/p 6/9 laminectomy and fusion C2-4.  Recovering as expected.   Existing Precautions/Restrictions fall;spinal;brace worn when out of bed  (Aspen collar at all times)   Cognition   Orientation Status (Cognition) oriented x 4   Pain Assessment   Patient Currently in Pain   (8/10, due for pain meds soon however able to participate)   Integumentary/Edema   Integumentary/Edema Comments See nursing notes   Posture    Posture Comments Aztec collar donned. Fwd flexed trunk in standing   Range of Motion (ROM)   ROM Comment BLE WFL   Strength   Strength Comments BLE > 3/5 strength    Bed Mobility   Comment (Bed Mobility) Supine > Sitting EOB with HOB elevated, use of logroll and min a   Transfers   Transfer Safety Comments Sts with no AD close CGA   Gait/Stairs (Locomotion)   Distance in Feet (Required for LE Total Joints) 5'   Comment (Gait/Stairs) Pt ambulated with BUE support on IV, short steps, slow jaya   Balance   Balance Comments Currently requires BUE support on IV for ambulation    Sensory Examination   Sensory Perception Comments Intact to light touch. Reports tingling in BUE thats different from pre surgery     Clinical Impression   Criteria for Skilled Therapeutic Intervention yes, treatment indicated   PT Diagnosis (PT) impaired IND with bed mobility, transfers, gait from baseline   Influenced by the following impairments pain, functional weakness, impaired activity tolernace   Functional limitations due to impairments see above   Clinical Presentation Stable/Uncomplicated   Clinical Presentation Rationale clinical judgement   Clinical Decision Making (Complexity) low complexity   Therapy Frequency (PT) Daily   Predicted Duration of Therapy Intervention (days/wks) 3 days   Planned Therapy Interventions (PT) balance training;bed mobility training;gait training;home exercise program;patient/family education;stair training;strengthening;transfer training   Risk & Benefits of therapy have been explained evaluation/treatment results reviewed;care plan/treatment goals reviewed;risks/benefits reviewed;patient   PT Discharge Planning    PT Discharge Recommendation (DC Rec) home with assist   PT Rationale for DC Rec Predict with further pain management and PT, pt will progress to be safe to DC to home with assist from .    Total Evaluation Time   Total Evaluation Time (Minutes) 15

## 2021-06-10 NOTE — PROGRESS NOTES
"Ortho Progress Note     Subjective:  No acute overnight events. Pain controlled on current regimen.  Reports preoperative 'buzzing' remains in the bilateral hands, though thinks it may be mildly improved.  Notes her hands feel a bit more dexterous today than they did preop.    Objective:  BP (!) 152/97   Pulse 78   Temp 98.8  F (37.1  C) (Oral)   Resp 16   Ht 1.575 m (5' 2\")   Wt 74.6 kg (164 lb 8 oz)   SpO2 94%   BMI 30.09 kg/m    Gen: A&Ox3, no acute distress  CV: 2+ dp/pt pulses, capillary refill < 2sec  Resp: breathing equal and non-labored, no wheezing  MSK:       Spine:   Skin: intact about neck without evidence of infection    Sensation:      R       L    C5:   Intact   Intact    C6:     Intact   Intact    C7:   Intact   Intact    C8:   Intact   Intact     Motor:     R L    C5: Deltoid   5  5    C6:   Biceps    5  5    C7: Triceps   5  5    C8:     5  5    T1: Intrinsics  5 5     No results found for: HGB    Drain: 30/40      Assessment/Plan:  S/p 6/9 laminectomy and fusion C2-4.  Recovering as expected.    -Activity:                                       Up with assist  -Weight Bearing Status:              WBAT   -Bracing:                                      Aspen collar at all times  -Antibiotics:                                  Ancef x24h  -Anticoagulation:                          SCDs only  -Pain control:                               IV and PO, wean to PO as able  -Drain:                                          Monitor and chart output  -Dressing:                                    Once drain removed, ok to shower with dressing in place, dressing ok to get wet.  -Diet:                                              ADAT  -Labs:                                             Hgb, BMP  -Imaging:                                      XR C spine prior to discharge     -Disposition:                                 Pending medical stability, PT, anticipate discharge around POD 3  -Follow up:           "                         2 weeks in my clinic    Mal Sampson MD  Orthopedic Spine Surgery  [facility]

## 2021-06-10 NOTE — PLAN OF CARE
POD 0 from a C2-C4 laminectomy fusion with Dr Sampson. CMS with numbness in bilateral arms, present before surgery. Bowel sounds hypo-active, not passing gas yet, patient on full liquid diet, tolerating well. VSS. Cervical collar in place. Hemovac patent. Patient has Cabrera cathter, to be removed in the morning. Up with assist of one with GB, stood at bedside. Pain managed with IV Dilaudid, patient also on PTA scheduled pain meds. Scoring green on the Aggression Stop Light Tool.  Zafar at bedside.

## 2021-06-10 NOTE — PLAN OF CARE
POD #1 from a C2-4 psf. A&O. CMS numbness in all extremities baseline. Bowel sounds active, passing flatus, tolerating full liquid diet. VSS. Dressing CDI.  [place] collar in place. Hemovac patent. Up with A1/GB. C/o 7/10 pain, decreased with 10 mg oxycodone and ativan PRN for anxiety. Pt scoring green on the Aggression Stop Light Tool. Plan to remove hooker this am and mobilize.

## 2021-06-10 NOTE — PLAN OF CARE
OT: Orders received. Chart reviewed and discussed with PT who reports pt does not have ADL training needs, will have assist at home from spouse. No OT needs at this time, all IP therapy needs being met by PT.  Defer discharge recommendations to PT.  Will complete orders, please re-order if needed

## 2021-06-11 ENCOUNTER — APPOINTMENT (OUTPATIENT)
Dept: GENERAL RADIOLOGY | Facility: CLINIC | Age: 56
End: 2021-06-11
Attending: ORTHOPAEDIC SURGERY
Payer: COMMERCIAL

## 2021-06-11 LAB
GLUCOSE BLDC GLUCOMTR-MCNC: 103 MG/DL (ref 70–99)
HGB BLD-MCNC: 12.7 G/DL (ref 11.7–15.7)

## 2021-06-11 PROCEDURE — 72040 X-RAY EXAM NECK SPINE 2-3 VW: CPT

## 2021-06-11 PROCEDURE — 999N001017 HC STATISTIC GLUCOSE BY METER IP

## 2021-06-11 PROCEDURE — 120N000001 HC R&B MED SURG/OB

## 2021-06-11 PROCEDURE — 96374 THER/PROPH/DIAG INJ IV PUSH: CPT

## 2021-06-11 PROCEDURE — 250N000013 HC RX MED GY IP 250 OP 250 PS 637: Performed by: ORTHOPAEDIC SURGERY

## 2021-06-11 PROCEDURE — 36415 COLL VENOUS BLD VENIPUNCTURE: CPT | Performed by: ORTHOPAEDIC SURGERY

## 2021-06-11 PROCEDURE — G0378 HOSPITAL OBSERVATION PER HR: HCPCS

## 2021-06-11 PROCEDURE — 250N000011 HC RX IP 250 OP 636: Performed by: ORTHOPAEDIC SURGERY

## 2021-06-11 PROCEDURE — 85018 HEMOGLOBIN: CPT | Performed by: ORTHOPAEDIC SURGERY

## 2021-06-11 RX ADMIN — LEVOTHYROXINE SODIUM 150 MCG: 150 TABLET ORAL at 05:54

## 2021-06-11 RX ADMIN — METHOCARBAMOL 500 MG: 500 TABLET ORAL at 12:22

## 2021-06-11 RX ADMIN — METHOCARBAMOL 500 MG: 500 TABLET ORAL at 00:11

## 2021-06-11 RX ADMIN — HYDROMORPHONE HYDROCHLORIDE 0.4 MG: 0.2 INJECTION, SOLUTION INTRAMUSCULAR; INTRAVENOUS; SUBCUTANEOUS at 10:29

## 2021-06-11 RX ADMIN — FLUTICASONE FUROATE AND VILANTEROL TRIFENATATE 1 PUFF: 200; 25 POWDER RESPIRATORY (INHALATION) at 08:31

## 2021-06-11 RX ADMIN — ACETAMINOPHEN 975 MG: 325 TABLET, FILM COATED ORAL at 21:09

## 2021-06-11 RX ADMIN — OXYCODONE HYDROCHLORIDE 10 MG: 5 TABLET ORAL at 00:05

## 2021-06-11 RX ADMIN — DULOXETINE HYDROCHLORIDE 120 MG: 30 CAPSULE, DELAYED RELEASE ORAL at 08:23

## 2021-06-11 RX ADMIN — SENNOSIDES AND DOCUSATE SODIUM 1 TABLET: 8.6; 5 TABLET ORAL at 20:32

## 2021-06-11 RX ADMIN — OXYCODONE HYDROCHLORIDE 10 MG: 5 TABLET ORAL at 12:22

## 2021-06-11 RX ADMIN — GABAPENTIN 900 MG: 300 CAPSULE ORAL at 21:09

## 2021-06-11 RX ADMIN — DOCUSATE SODIUM 100 MG: 100 CAPSULE, LIQUID FILLED ORAL at 20:32

## 2021-06-11 RX ADMIN — AMLODIPINE BESYLATE 5 MG: 5 TABLET ORAL at 08:24

## 2021-06-11 RX ADMIN — PANTOPRAZOLE SODIUM 40 MG: 40 TABLET, DELAYED RELEASE ORAL at 05:55

## 2021-06-11 RX ADMIN — OXYCODONE HYDROCHLORIDE 10 MG: 5 TABLET ORAL at 20:33

## 2021-06-11 RX ADMIN — ACETAMINOPHEN 975 MG: 325 TABLET, FILM COATED ORAL at 15:28

## 2021-06-11 RX ADMIN — GABAPENTIN 900 MG: 300 CAPSULE ORAL at 08:23

## 2021-06-11 RX ADMIN — METHOCARBAMOL 500 MG: 500 TABLET ORAL at 05:55

## 2021-06-11 RX ADMIN — GABAPENTIN 900 MG: 300 CAPSULE ORAL at 15:28

## 2021-06-11 RX ADMIN — METHOCARBAMOL 500 MG: 500 TABLET ORAL at 18:44

## 2021-06-11 RX ADMIN — OXYCODONE HYDROCHLORIDE 10 MG: 5 TABLET ORAL at 16:25

## 2021-06-11 RX ADMIN — POLYETHYLENE GLYCOL 3350 17 G: 17 POWDER, FOR SOLUTION ORAL at 08:24

## 2021-06-11 RX ADMIN — OXYCODONE HYDROCHLORIDE 10 MG: 5 TABLET ORAL at 08:23

## 2021-06-11 RX ADMIN — OXYCODONE HYDROCHLORIDE 10 MG: 5 TABLET ORAL at 04:20

## 2021-06-11 RX ADMIN — ACETAMINOPHEN 975 MG: 325 TABLET, FILM COATED ORAL at 05:55

## 2021-06-11 RX ADMIN — ATORVASTATIN CALCIUM 20 MG: 20 TABLET, FILM COATED ORAL at 08:24

## 2021-06-11 ASSESSMENT — ACTIVITIES OF DAILY LIVING (ADL)
ADLS_ACUITY_SCORE: 16

## 2021-06-11 NOTE — PROGRESS NOTES
"Ortho Progress Note     Subjective:  No acute overnight events. Pain well controlled.   Mobilizing well.   Again reports the preop numbness in the hands is mildly improved so far.  Feels good with the way things are going    Objective:  /81   Pulse 80   Temp 98.4  F (36.9  C)   Resp 16   Ht 1.575 m (5' 2\")   Wt 74.6 kg (164 lb 8 oz)   SpO2 94%   BMI 30.09 kg/m    Gen: A&Ox3, no acute distress  CV: 2+ dp/pt pulses, capillary refill < 2sec  Resp: breathing equal and non-labored, no wheezing  MSK:       Spine:   Sensation:      R       L    C5:   Intact   Intact    C6:     Intact   Intact    C7:   Intact   Intact    C8:   Intact   Intact     Motor:     R L    C5: Deltoid   5  5    C6:   Biceps    5  5    C7: Triceps   5  5    C8:     5  5    T1: Intrinsics  5 5     Hemoglobin   Date Value Ref Range Status   06/10/2021 13.3 11.7 - 15.7 g/dL Final       Drain: 30/40/20      Assessment/Plan:  S/p 6/9 laminectomy and fusion C2-4.  Recovering as expected.    -Activity:                                       Up with assist  -Weight Bearing Status:              WBAT   -Bracing:                                      Aspen collar at all times  -Antibiotics:                                  Ancef x24h  -Anticoagulation:                          SCDs only  -Pain control:                               IV and PO, wean to PO as able  -Drain:                                          Monitor and chart output, will remove tomorrow  -Dressing:                                    Once drain removed, ok to shower with dressing in place, dressing ok to get wet.  -Diet:                                              ADAT  -Imaging:                                      XR C spine prior to discharge     -Disposition:                                 Pending medical stability, PT, anticipate discharge 6/12  -Follow up:                                   2 weeks in my clinic    Mal Sampson MD  Orthopedic Spine Surgery  [facility]

## 2021-06-11 NOTE — PROGRESS NOTES
S:  We received an order for a shower collar.    O:  I met with the patient in room 714-01 and she is resting.  I work her up and introduced myself explaining I was there to fit her with a showering collar.  I removed the [place] collar.  I donned a Lidia showering collar and locked it in setting #2.  I cut some of the excess sides off of the posterior portion of the collar that wrap around to the front.  A:  The Lidia collar fits her adequate and will provide support and restriction when she is bathing.  P:  The patient will wear the Lidia collar when bathing.  G:  Provide showering collar  Donavan NUÑEZ

## 2021-06-11 NOTE — PLAN OF CARE
POD 2 C2-4 Lami, fusion. A&O. VSS. Pain reduced with PRN oxycodone, robaxin, scheduled tylenol. CMS intact. Baseline N,T to all extremities. Dressing CDI. C collar in place. Voiding adequately. Discharge pending progress.

## 2021-06-11 NOTE — PLAN OF CARE
POD 1 from a C2-C4 laminectomy fusion with Dr Sampson. CMS with numbness in bilateral arms, present before surgery, also numbness in both feet, at baseline. Bowel present, passing gas, tolerating regular diet.VSS. Cervical collar in place. Hemovac patent. Ambulates to bathroom with SBA. Pain managed with Ativan scheduled Tylenol, PRN Oxycodone, patient also on PTA scheduled pain meds. Scoring green on the Aggression Stop Light Tool.

## 2021-06-11 NOTE — PLAN OF CARE
Patient POD#1 C2-4 posterior lami/fusion. Slightly hypertensive, improving as day progresses, needing O2 2LPM when sleeping. Bilateral numbness in feet and hands L>R, lungs with inspiratory and expiratory wheezes, improved with albuterol inhaler, nebs ordered prn.  Up with SBA/GB, voiding adequately, PVR's minimal. Rating pain 4-9/10, decreased with heat packs to shoulders, ice to posterior neck, oxycodone/robaxin/tylenol/and ativan. Hmv with 40cc output. Plan for discharge Saturday per Dr. Sampson's progress note, stimulator rep to contact patient once she is discharged to home.

## 2021-06-11 NOTE — PROGRESS NOTES
Pt POD#2 laminectomy and fusion C2-4. Pt A&O x4. VSS, on RA. Aspen collar on at all times. Posterior dressing dry drainage, unchanged, Hemovac patent, 5 mL output,remains in place until tomorrow. BUE numbness from shoulders down baseline, improvement wax and wanes per patient. LLE numbness, baseline otherwise CMS intact. Pain managed with PRN Oxycodone, Robaxin. Up SBA / GB. +BS, passing flatus. Reg diet, thin liquids. Pt scoring green on Aggression Stop Light Tool. Plans to discharge  home tomorrow.

## 2021-06-11 NOTE — PLAN OF CARE
Pt POD#2 laminectomy and fusion C2-4. Pt A&O x4. VSS, on RA. LS diminished, clear, continues to use IS. Aspen collar on at all times. Posterior dressing dry drainage, unchanged, Dr Sampson stated to keep in until tomorrow. BUE numbness from shoulders down, prior to surgery, improvement wax and wanes per patient. LLE numbness, baseline otherwise CMS intact. Pain managed with PRN Oxycodone, Robaxin, IV dilaudid x1, and scheduled Tylenol. Up SBA w/ GB. Voiding adequately. +BS, passing flatus, scheduled Miralax given refused stool softeners. Reg diet, takes pills whole with water. Cervical Xray completed. Fitted for shower collar. Pt scoring green on Aggression Stop Light Tool. Plans to discharge to home tomorrow.

## 2021-06-11 NOTE — PLAN OF CARE
PT: Attempted to see pt in AM x 2, pt declining as pt recently returning to bed upon attempts, has been doing well ambulating in hallways with nursing staff without assistive device. Pt reports she is feeling good with her mobility, reporting no concerns with navigating 4 stairs into house. Will continue to follow

## 2021-06-12 ENCOUNTER — APPOINTMENT (OUTPATIENT)
Dept: PHYSICAL THERAPY | Facility: CLINIC | Age: 56
End: 2021-06-12
Attending: ORTHOPAEDIC SURGERY
Payer: COMMERCIAL

## 2021-06-12 VITALS
BODY MASS INDEX: 30.27 KG/M2 | OXYGEN SATURATION: 91 % | SYSTOLIC BLOOD PRESSURE: 130 MMHG | TEMPERATURE: 97.8 F | DIASTOLIC BLOOD PRESSURE: 85 MMHG | HEIGHT: 62 IN | WEIGHT: 164.5 LBS | RESPIRATION RATE: 17 BRPM | HEART RATE: 72 BPM

## 2021-06-12 PROCEDURE — 97530 THERAPEUTIC ACTIVITIES: CPT | Mod: GP | Performed by: PHYSICAL THERAPIST

## 2021-06-12 PROCEDURE — 250N000013 HC RX MED GY IP 250 OP 250 PS 637: Performed by: ORTHOPAEDIC SURGERY

## 2021-06-12 PROCEDURE — G0378 HOSPITAL OBSERVATION PER HR: HCPCS

## 2021-06-12 RX ORDER — AMOXICILLIN 250 MG
1 CAPSULE ORAL 2 TIMES DAILY
Qty: 28 TABLET | Refills: 0 | Status: SHIPPED | OUTPATIENT
Start: 2021-06-12 | End: 2021-06-26

## 2021-06-12 RX ORDER — OXYCODONE HYDROCHLORIDE 5 MG/1
5 TABLET ORAL EVERY 4 HOURS PRN
Qty: 40 TABLET | Refills: 0 | Status: SHIPPED | OUTPATIENT
Start: 2021-06-12

## 2021-06-12 RX ORDER — METHOCARBAMOL 500 MG/1
500 TABLET, FILM COATED ORAL 4 TIMES DAILY PRN
Qty: 50 TABLET | Refills: 0 | Status: SHIPPED | OUTPATIENT
Start: 2021-06-12

## 2021-06-12 RX ADMIN — DULOXETINE HYDROCHLORIDE 120 MG: 30 CAPSULE, DELAYED RELEASE ORAL at 07:51

## 2021-06-12 RX ADMIN — OXYCODONE HYDROCHLORIDE 5 MG: 5 TABLET ORAL at 05:09

## 2021-06-12 RX ADMIN — ATORVASTATIN CALCIUM 20 MG: 20 TABLET, FILM COATED ORAL at 07:52

## 2021-06-12 RX ADMIN — OXYCODONE HYDROCHLORIDE 10 MG: 5 TABLET ORAL at 08:49

## 2021-06-12 RX ADMIN — METHOCARBAMOL 500 MG: 500 TABLET ORAL at 07:50

## 2021-06-12 RX ADMIN — PANTOPRAZOLE SODIUM 40 MG: 40 TABLET, DELAYED RELEASE ORAL at 06:46

## 2021-06-12 RX ADMIN — GABAPENTIN 900 MG: 300 CAPSULE ORAL at 07:52

## 2021-06-12 RX ADMIN — LORAZEPAM 1 MG: 1 TABLET ORAL at 10:19

## 2021-06-12 RX ADMIN — LEVOTHYROXINE SODIUM 150 MCG: 150 TABLET ORAL at 06:46

## 2021-06-12 RX ADMIN — FLUTICASONE FUROATE AND VILANTEROL TRIFENATATE 1 PUFF: 200; 25 POWDER RESPIRATORY (INHALATION) at 07:53

## 2021-06-12 RX ADMIN — OXYCODONE HYDROCHLORIDE 10 MG: 5 TABLET ORAL at 01:07

## 2021-06-12 RX ADMIN — SENNOSIDES AND DOCUSATE SODIUM 1 TABLET: 8.6; 5 TABLET ORAL at 07:51

## 2021-06-12 RX ADMIN — AMLODIPINE BESYLATE 5 MG: 5 TABLET ORAL at 07:52

## 2021-06-12 RX ADMIN — ACETAMINOPHEN 975 MG: 325 TABLET, FILM COATED ORAL at 05:09

## 2021-06-12 ASSESSMENT — ACTIVITIES OF DAILY LIVING (ADL)
ADLS_ACUITY_SCORE: 18

## 2021-06-12 NOTE — PLAN OF CARE
POD #3 from a C2 - C4 laminectomy and fusion with Dr. Sampson. [place] collar in place. Alert, oriented x4. CMS with numbness in the BUE's which is improving per pt, and baseline numbness in the LLE, strength 5/5 in all extremities and pulses strong and equal bilaterally. Bowel sounds audible, passing flatus, tolerating regular diet. VSS on RA. Dressing with minimal serosanguinous drainage. Hemovac in place with an output of 30 mL overnight. Up with A1 using GB. C/o incisional pain, decreased with scheduled tylenol and PRN oxycodone. PIV SL. Pt scoring green on the Aggression Stop Light Tool. Plan to discharge home following drain removal.

## 2021-06-12 NOTE — DISCHARGE SUMMARY
ORTHOPAEDIC DISCHARGE SUMMARY     Date of Admission: 6/9/2021  Date of Discharge: 6/12/2021  Disposition: Home  Surgeon: Mal Sampson MD      DISCHARGE DIAGNOSIS:  Spondylolisthesis of cervical region [M43.12]  Other kyphosis, cervical region [M40.292]  Spinal stenosis in cervical region [M48.02]    PROCEDURES: Procedure(s):  CERVICAL 2 TO CERVICAL 4 LAMINECTOMY AND POSTERIOR INSTRUMENTED FUSION on 6/9/2021    BRIEF HISTORY:  This was a planned admission after the above elective procedure.    HOSPITAL COURSE:    Surgery was uncomplicated. Veronica Lala has done well post-operatively.     The patient received routine nursing cares and is medically stable. Vital signs are stable. The patient is tolerating a regular diet without GI distress/nausea or vomiting. Voiding spontaneously. All PT/OT goals have been met for safe mobility. Pain is now controlled on oral medications which will be available on discharge. Stool softeners have been used while taking pain medications to help prevent constipation. Veronica Lala is deemed medically safe to discharge.     Antibiotics:  Ancef given periop and 24 hours postop.  PT Progress:  Has met PT/OT goals for safe mobility.   Pain Meds:  Weaned off all IV pain meds by discharge.  Inpatient Events: No significant events or complications.     Discharge orders and instructions as below.    FOLLOWUP:    Future Appointments   Date Time Provider Department Center   6/12/2021 10:00 AM Esthela Shen, PT [facility]         PLANNED DISCHARGE ORDERS:     DVT Prophylaxis: Mobilization        Current Discharge Medication List      START taking these medications    Details   methocarbamol (ROBAXIN) 500 MG tablet Take 1 tablet (500 mg) by mouth 4 times daily as needed for muscle spasms  Qty: 50 tablet, Refills: 0    Associated Diagnoses: Cervical stenosis of spinal canal      oxyCODONE (ROXICODONE) 5 MG tablet Take 1 tablet (5 mg) by mouth every 4 hours as needed for moderate to  severe pain  Qty: 40 tablet, Refills: 0    Associated Diagnoses: Cervical stenosis of spinal canal      senna-docusate (SENOKOT-S/PERICOLACE) 8.6-50 MG tablet Take 1 tablet by mouth 2 times daily for 14 days  Qty: 28 tablet, Refills: 0    Associated Diagnoses: Cervical stenosis of spinal canal         CONTINUE these medications which have NOT CHANGED    Details   albuterol (PROAIR HFA/PROVENTIL HFA/VENTOLIN HFA) 108 (90 Base) MCG/ACT inhaler Inhale 2 puffs into the lungs every 4 hours as needed for shortness of breath / dyspnea    Comments: Pharmacy may dispense brand covered by insurance (Proair, or proventil or ventolin or generic albuterol inhaler)      amLODIPine (NORVASC) 5 MG tablet Take 5 mg by mouth every morning      atorvastatin (LIPITOR) 20 MG tablet Take 20 mg by mouth daily      budesonide-formoterol (SYMBICORT) 160-4.5 MCG/ACT Inhaler Inhale 2 puffs into the lungs 2 times daily      DULoxetine (CYMBALTA) 60 MG capsule Take 120 mg by mouth daily (60mg x 2 = 120mg)      fish oil-omega-3 fatty acids 1000 MG capsule Take 1 g by mouth daily      gabapentin (NEURONTIN) 600 MG tablet Take 900 mg by mouth 3 times daily (600mg x 1.5 = 900)      Homeopathic Products (LEG CRAMP RELIEF) TABS Take 2-4 tablets by mouth daily      levothyroxine (SYNTHROID/LEVOTHROID) 150 MCG tablet Take 150 mcg by mouth daily      LORazepam (ATIVAN) 1 MG tablet Take 1 mg by mouth 3 times daily      multivitamin w/minerals (MULTI-VITAMIN) tablet Take 1 tablet by mouth daily      ondansetron (ZOFRAN) 4 MG tablet Take 4 mg by mouth every 6 hours as needed for nausea      pantoprazole (PROTONIX) 40 MG EC tablet Take 40 mg by mouth daily      Potassium (POTASSIMIN PO) Take 1 tablet by mouth daily (over the counter)      Vitamin D3 (CHOLECALCIFEROL) 25 mcg (1000 units) tablet Take 1 tablet by mouth daily         STOP taking these medications       carisoprodol (SOMA) 350 MG tablet Comments:   Reason for Stopping:         celecoxib  (CELEBREX) 200 MG capsule Comments:   Reason for Stopping:         HYDROcodone-acetaminophen (NORCO) 5-325 MG tablet Comments:   Reason for Stopping:                 Discharge Procedure Orders   Reason for your hospital stay   Order Comments: C2-4 laminectomy and fusion     Follow-up and recommended labs and tests   Order Comments: Follow up with Dr Sampson in 2 weeks     Activity   Order Comments: Your activity upon discharge:     Keep your cervical collar in place at all times, except you may remove it for showering and eating.  When you remove your collar for those activities, keep looking straight ahead while the collar is off.    No excessive bending or twisting, no lifting >10 lbs x2 weeks, then no lifting >25 lbs until six weeks after surgery.     Order Specific Question Answer Comments   Is discharge order? Yes      When to contact your care team   Order Comments: Call your primary doctor if you have any of the following: temperature greater than 101.5 sustained over >2 hours, increased shortness of breath or increased drainage.     Wound care and dressings   Order Comments: Instructions to care for your wound at home:     You have a waterproof plastic dressing over your surgical incision, with steri strips underneath.  You can shower with the plastic dressing in place, but do not scrub the dressing site.  If water gets underneath the dressing, you should remove it and leave it off.  If it has not come off by that time, one week after surgery you may remove the plastic dressing and not replace it.   The steri strips underneath usually stay on for around 1 to 2 weeks, before they fall off on their own.  Once the steri strips have mostly  from your skin, you may remove them the rest of the way.  If the steri strips are still in place 3 weeks after surgery, you may pull them off yourself.    Once the plastic dressing is removed, you may continue to shower normally, allowing water to run over the incision  and steri strips.  Do not immerse your incision in water (e.g. bath, hot tub, pool) until four weeks after the operation.     If you notice significant spreading redness around your incision, or there is drainage from your incision, please contact Dr Sampson's office immediately for evaluation.     ABO/Rh Type and Screen   Standing Status: Future Standing Exp. Date: 06/07/22     Red blood cell have available   Standing Status: Future Standing Exp. Date: 06/07/22   Scheduling Instructions: IRRADIATION is NOT Indicated for: (Scroll to see all items.)  Adults only: Acute or chronic leukemia                      Adults only: Non-Hodgkin's lymphoma  Patients on high dose steroids                                     Solid organ transplant patients  Severe leukopenia, lymphopenia, pancytopenia    To prevent HLA alloimmunization  Use of immune suppressants such as                         HIV  Infection/AIDS      Azathioprine, cyclosporine, MMF  Do NOT change or add to this text.  Use additional instructions field.     Order Specific Question Answer Comments   Irradiation Indication Not irradiated    Red Blood Cells: NON-Irradiated    Does the patient have sickle cell disease or thalassemia? No    Number of Units 1    Have available for Surgery      Diet   Order Comments: Follow this diet upon discharge: Regular     Order Specific Question Answer Comments   Is discharge order? Yes            Mal Sampson MD  Orthopaedic Spine Surgery  [facility]

## 2021-06-12 NOTE — PLAN OF CARE
Physical Therapy Discharge Summary    Reason for therapy discharge:    All goals and outcomes met, no further needs identified.    Progress towards therapy goal(s). See goals on Care Plan in [facility] electronic health record for goal details.  Goals met    Therapy recommendation(s):    No further therapy is recommended.Pt will benefit form frequent short bouts of ambulation and AROM at B UEs for ongoing strength and activity tolerance benefits.

## 2021-06-12 NOTE — PROGRESS NOTES
Patient discharged at 11:39 AM to home. IV was discontinued. Pain at time of discharge was controlled. Belongings returned to patient.  Discharge instructions and medications reviewed with patient.  Patient verbalized understanding and all questions were answered.  Prescriptions given to patient.  At time of discharge, patient condition was stable and left the unit via WC escorted by KRISTIN.

## 2021-06-12 NOTE — PROGRESS NOTES
"Ortho Progress Note     Subjective:  Doing well.  Pain controlled.   Mobilizing well, feels stable on her feet.  Continues to note some further improvement in dexterity of her hands, feels she is having more sensation than preop.  She is very happy with her progress.    Objective:  /70 (BP Location: Right arm)   Pulse 63   Temp 97.9  F (36.6  C) (Oral)   Resp 16   Ht 1.575 m (5' 2\")   Wt 74.6 kg (164 lb 8 oz)   SpO2 93%   BMI 30.09 kg/m    Gen: A&Ox3, no acute distress  CV: 2+ dp/pt pulses, capillary refill < 2sec  Resp: breathing equal and non-labored, no wheezing  MSK:       Spine:   Sensation:      R       L    C5:   Intact   Intact    C6:     Intact   Intact    C7:   Intact   Intact    C8:   Intact   Intact     Motor:     R L    C5: Deltoid   5  5    C6:   Biceps    5  5    C7: Triceps   5  5    C8:     5  5    T1: Intrinsics  5 5     Hemoglobin   Date Value Ref Range Status   06/11/2021 12.7 11.7 - 15.7 g/dL Final   06/10/2021 13.3 11.7 - 15.7 g/dL Final       Drain: 30/40/20/15/5/30      Assessment/Plan:  S/p 6/9 laminectomy and fusion C2-4.  Recovering as expected.    -Activity:                                       Up with assist  -Weight Bearing Status:              WBAT   -Bracing:                                      Aspen collar at all times  -Anticoagulation:                          SCDs only  -Pain control:                               PO  -Drain:                                          Removed  -Dressing:                                    Ok to shower with dressing in place, dressing ok to get wet.  -Diet:                                              ADAT  -Imaging:                                      XR C spine complete, look good.     -Disposition:                                 Pending medical stability, PT, anticipate discharge 6/12  -Follow up:                                   2 weeks in my clinic    Mal Sampson MD  Orthopedic Spine Surgery  [facility]   "

## (undated) DEVICE — SU VICRYL 2-0 CT-2 27" UND J269H

## (undated) DEVICE — MANIFOLD NEPTUNE 4 PORT 700-20

## (undated) DEVICE — CASSETTE SONOPET IRRIG SUCTION STRL 5500-573-000

## (undated) DEVICE — SOL WATER IRRIG 1000ML BOTTLE 2F7114

## (undated) DEVICE — BLADE BONE MILL STRK 3.2MM FINE 5400-702-000

## (undated) DEVICE — RX SURGIFLO HEMOSTATIC MATRIX W/THROMBIN 8ML 2994

## (undated) DEVICE — MARKER SPHERES PASSIVE MEDT PACK 5 8801075

## (undated) DEVICE — BLADE KNIFE SURG 11 371111

## (undated) DEVICE — IMM COLLAR CERVICAL MED UNIVERSAL 3X24" 79-83500

## (undated) DEVICE — LINEN TOWEL PACK X5 5464

## (undated) DEVICE — TOOL DISSECT MIDAS MR8 14CM MATCH HEAD 3MM MR8-14MH30

## (undated) DEVICE — TOOL DISSECT MIDAS MR8 15CM TAPER LONG 2.3MM MR8-15TA23L

## (undated) DEVICE — KIT DRAIN CLOSED WOUND SUCTION MED 400ML RESVR

## (undated) DEVICE — GOWN XLG DISP 9545

## (undated) DEVICE — SU VICRYL 0 CT-1 CR 8X18" J740D

## (undated) DEVICE — Device

## (undated) DEVICE — DRAPE MAYO STAND 23X54 8337

## (undated) DEVICE — SPONGE SURGIFOAM 100 1974

## (undated) DEVICE — DRSG KERLIX FLUFFS X5

## (undated) DEVICE — SUCTION FRAZIER 12FR W/HANDLE K73

## (undated) DEVICE — STPL SKIN 35W ROTATING HEAD PRW35

## (undated) DEVICE — DRSG TELFA ISLAND 4X8" 7541

## (undated) DEVICE — IMM COLLAR CERVICAL MED UNIVERSAL 2.5X24" 79-83520

## (undated) DEVICE — PREP DURAPREP 06ML APL 8635

## (undated) DEVICE — ESU GROUND PAD UNIVERSAL W/O CORD

## (undated) DEVICE — DRAPE C-ARMOR 5 SIDED 5523

## (undated) DEVICE — CATH TRAY FOLEY SURESTEP 16FR W/URNE MTR STLK LATEX A303316A

## (undated) DEVICE — ESU ELEC BLADE 2.75" COATED/INSULATED E1455

## (undated) DEVICE — SU MONOCRYL 3-0 PS-2 27" Y427H

## (undated) DEVICE — SOL ADH LIQUID BENZOIN SWAB 0.6ML C1544

## (undated) DEVICE — SU STRATAFIX PDS PLUS 1 CT-1 18" SXPP1A404

## (undated) DEVICE — TUBING SUCTION SOFT 20'X3/16" 0036570

## (undated) DEVICE — DRAPE MICROSCOPE EQUIP 48X120" 6130VL2

## (undated) DEVICE — DRAPE O ARM TUBE 9732722

## (undated) DEVICE — PACK SPINE SM CUSTOM SNE15SSFSK

## (undated) DEVICE — DRAIN JACKSON PRATT RESERVOIR 100ML SU130-1305

## (undated) DEVICE — IOM SUPPLIES

## (undated) DEVICE — NDL SPINAL 22GA 3.5" QUINCKE 405181

## (undated) DEVICE — DRAPE COVER C-ARM SEAMLESS SNAP-KAP 03-KP26 LATEX FREE

## (undated) DEVICE — CATH TRAY FOLEY SURESTEP 16FR W/URINE MTR STATLK LF A303416A

## (undated) DEVICE — DRAPE SHEET REV FOLD 3/4 9349

## (undated) DEVICE — IONM UP TO 7 HOURS

## (undated) DEVICE — PIN SKULL MAYFIELD ADULT TITANIUM 3/PK A1120

## (undated) RX ORDER — PROPOFOL 10 MG/ML
INJECTION, EMULSION INTRAVENOUS
Status: DISPENSED
Start: 2021-06-09

## (undated) RX ORDER — CEFAZOLIN SODIUM 2 G/100ML
INJECTION, SOLUTION INTRAVENOUS
Status: DISPENSED
Start: 2021-06-09

## (undated) RX ORDER — GABAPENTIN 300 MG/1
CAPSULE ORAL
Status: DISPENSED
Start: 2021-06-09

## (undated) RX ORDER — VANCOMYCIN HYDROCHLORIDE 1 G/20ML
INJECTION, POWDER, LYOPHILIZED, FOR SOLUTION INTRAVENOUS
Status: DISPENSED
Start: 2021-06-09

## (undated) RX ORDER — DEXAMETHASONE SODIUM PHOSPHATE 4 MG/ML
INJECTION, SOLUTION INTRA-ARTICULAR; INTRALESIONAL; INTRAMUSCULAR; INTRAVENOUS; SOFT TISSUE
Status: DISPENSED
Start: 2021-06-09

## (undated) RX ORDER — FENTANYL CITRATE 50 UG/ML
INJECTION, SOLUTION INTRAMUSCULAR; INTRAVENOUS
Status: DISPENSED
Start: 2021-06-09

## (undated) RX ORDER — ONDANSETRON 2 MG/ML
INJECTION INTRAMUSCULAR; INTRAVENOUS
Status: DISPENSED
Start: 2021-06-09

## (undated) RX ORDER — BUPIVACAINE HYDROCHLORIDE 5 MG/ML
INJECTION, SOLUTION EPIDURAL; INTRACAUDAL
Status: DISPENSED
Start: 2021-06-09

## (undated) RX ORDER — GINSENG 100 MG
CAPSULE ORAL
Status: DISPENSED
Start: 2021-06-09

## (undated) RX ORDER — REMIFENTANIL HYDROCHLORIDE 1 MG/ML
INJECTION, POWDER, LYOPHILIZED, FOR SOLUTION INTRAVENOUS
Status: DISPENSED
Start: 2021-06-09

## (undated) RX ORDER — ACETAMINOPHEN 325 MG/1
TABLET ORAL
Status: DISPENSED
Start: 2021-06-09

## (undated) RX ORDER — EPINEPHRINE 1 MG/ML
INJECTION, SOLUTION INTRAMUSCULAR; SUBCUTANEOUS
Status: DISPENSED
Start: 2021-06-09

## (undated) RX ORDER — HYDROMORPHONE HYDROCHLORIDE 1 MG/ML
INJECTION, SOLUTION INTRAMUSCULAR; INTRAVENOUS; SUBCUTANEOUS
Status: DISPENSED
Start: 2021-06-09

## (undated) RX ORDER — LIDOCAINE HYDROCHLORIDE 20 MG/ML
INJECTION, SOLUTION EPIDURAL; INFILTRATION; INTRACAUDAL; PERINEURAL
Status: DISPENSED
Start: 2021-06-09

## (undated) RX ORDER — ALBUTEROL SULFATE 90 UG/1
AEROSOL, METERED RESPIRATORY (INHALATION)
Status: DISPENSED
Start: 2021-06-09